# Patient Record
Sex: FEMALE | Race: WHITE | NOT HISPANIC OR LATINO | Employment: FULL TIME | URBAN - METROPOLITAN AREA
[De-identification: names, ages, dates, MRNs, and addresses within clinical notes are randomized per-mention and may not be internally consistent; named-entity substitution may affect disease eponyms.]

---

## 2017-10-26 ENCOUNTER — GENERIC CONVERSION - ENCOUNTER (OUTPATIENT)
Dept: OTHER | Facility: OTHER | Age: 42
End: 2017-10-26

## 2017-11-06 ENCOUNTER — GENERIC CONVERSION - ENCOUNTER (OUTPATIENT)
Dept: OTHER | Facility: OTHER | Age: 42
End: 2017-11-06

## 2018-11-20 ENCOUNTER — TELEPHONE (OUTPATIENT)
Dept: INTERNAL MEDICINE CLINIC | Facility: CLINIC | Age: 43
End: 2018-11-20

## 2018-11-20 ENCOUNTER — OFFICE VISIT (OUTPATIENT)
Dept: INTERNAL MEDICINE CLINIC | Facility: CLINIC | Age: 43
End: 2018-11-20
Payer: COMMERCIAL

## 2018-11-20 VITALS
BODY MASS INDEX: 35.12 KG/M2 | HEIGHT: 67 IN | WEIGHT: 223.8 LBS | TEMPERATURE: 98.2 F | SYSTOLIC BLOOD PRESSURE: 122 MMHG | RESPIRATION RATE: 14 BRPM | DIASTOLIC BLOOD PRESSURE: 80 MMHG | HEART RATE: 82 BPM

## 2018-11-20 DIAGNOSIS — J40 BRONCHITIS: Primary | ICD-10-CM

## 2018-11-20 DIAGNOSIS — J06.9 VIRAL UPPER RESPIRATORY TRACT INFECTION: ICD-10-CM

## 2018-11-20 PROCEDURE — 87880 STREP A ASSAY W/OPTIC: CPT | Performed by: PHYSICIAN ASSISTANT

## 2018-11-20 PROCEDURE — 99213 OFFICE O/P EST LOW 20 MIN: CPT | Performed by: PHYSICIAN ASSISTANT

## 2018-11-20 PROCEDURE — 1036F TOBACCO NON-USER: CPT | Performed by: PHYSICIAN ASSISTANT

## 2018-11-20 PROCEDURE — 3008F BODY MASS INDEX DOCD: CPT | Performed by: PHYSICIAN ASSISTANT

## 2018-11-20 NOTE — PROGRESS NOTES
Assessment/Plan:  Rapid strep is negative most likely viral illness treated with Allegra-D 12 Tylenol over-the-counter cough medicine return if she worsens follow-up for a physical in a couple of months       Diagnoses and all orders for this visit:    Bronchitis    Viral upper respiratory tract infection  -     POCT rapid strepA    Other orders  -     Norethin-Eth Estrad-Fe Biphas (LO LOESTRIN FE) 1 MG-10 MCG / 10 MCG TABS; Take by mouth        No problem-specific Assessment & Plan notes found for this encounter  Subjective:      Patient ID: Pretty Salas is a 40 y o  female  She developed feverishness scratchy throat stuffy nose and coughing 10 days ago she took over-the-counter medication and her symptoms began to improve  Then 5 days ago she again had feverishness increasing cough of green phlegm scratchy throat and stuffy nose  Her coughing is no longer productive  No shortness of breath or wheezing no obvious fever chills or pleuritic pain today she has hoarseness and a lot of nasal congestion with coughing at night  She is normally active and well        The following portions of the patient's history were reviewed and updated as appropriate:   She has no past medical history on file  ,   does not have a problem list on file  ,   has no past surgical history on file  ,  family history is not on file  ,   reports that she has never smoked  She has never used smokeless tobacco  She reports that she does not drink alcohol or use drugs  ,  has No Known Allergies     Current Outpatient Prescriptions   Medication Sig Dispense Refill    Norethin-Eth Estrad-Fe Biphas (LO LOESTRIN FE) 1 MG-10 MCG / 10 MCG TABS Take by mouth       No current facility-administered medications for this visit  Review of Systems   Constitutional: Negative for activity change, appetite change, chills, diaphoresis, fatigue, fever and unexpected weight change     HENT: Positive for postnasal drip, rhinorrhea, sore throat and voice change  Negative for congestion, dental problem, drooling, ear discharge, ear pain, facial swelling, hearing loss, nosebleeds, sinus pain, sinus pressure, sneezing, tinnitus and trouble swallowing  Eyes: Negative for photophobia, pain, discharge, redness, itching and visual disturbance  Respiratory: Positive for cough  Negative for apnea, choking, chest tightness, shortness of breath, wheezing and stridor  Cardiovascular: Negative for chest pain, palpitations and leg swelling  Gastrointestinal: Negative for abdominal distention, abdominal pain, anal bleeding, blood in stool, constipation, diarrhea, nausea, rectal pain and vomiting  Endocrine: Negative for cold intolerance, heat intolerance, polydipsia, polyphagia and polyuria  Genitourinary: Negative for decreased urine volume, difficulty urinating, dysuria, enuresis, flank pain, frequency, genital sores, hematuria and urgency  Musculoskeletal: Negative for arthralgias, back pain, gait problem, joint swelling, myalgias, neck pain and neck stiffness  Skin: Negative for color change, pallor, rash and wound  Neurological: Negative for dizziness, tremors, seizures, syncope, facial asymmetry, speech difficulty, weakness, light-headedness, numbness and headaches  Hematological: Negative for adenopathy  Does not bruise/bleed easily  Psychiatric/Behavioral: Negative for agitation, behavioral problems, confusion, decreased concentration, dysphoric mood, hallucinations, self-injury, sleep disturbance and suicidal ideas  The patient is not nervous/anxious and is not hyperactive  Objective:  Vitals:    11/20/18 1127   BP: 122/80   BP Location: Left arm   Patient Position: Sitting   Cuff Size: Standard   Pulse: 82   Resp: 14   Temp: 98 2 °F (36 8 °C)   TempSrc: Oral   Weight: 102 kg (223 lb 12 8 oz)   Height: 5' 7" (1 702 m)     Body mass index is 35 05 kg/m²       Physical Exam   Constitutional: She is oriented to person, place, and time  She appears well-developed  Obese   HENT:   Head: Normocephalic  Right Ear: External ear normal    Left Ear: External ear normal    Mouth/Throat: Oropharynx is clear and moist  No oropharyngeal exudate (Hoarseness noted)  Eyes: Pupils are equal, round, and reactive to light  Conjunctivae are normal    Neck: Neck supple  No JVD present  No thyromegaly present  Cardiovascular: Normal rate, regular rhythm, normal heart sounds and intact distal pulses  Pulmonary/Chest: Effort normal and breath sounds normal  No respiratory distress  She has no wheezes  She has no rales  She exhibits no tenderness  Abdominal: Soft  Bowel sounds are normal    Musculoskeletal: Normal range of motion  She exhibits no edema  Lymphadenopathy:     She has no cervical adenopathy  Neurological: She is alert and oriented to person, place, and time  She has normal reflexes  Skin: Skin is warm and dry

## 2018-11-20 NOTE — TELEPHONE ENCOUNTER
PT  BEAU  HASN'T BEEN  HERE SINCE  10/2015 WANTS  TO KNOW  IF  BEAU  CAN SEE  HER  SOON  FOR  COLD / SORE  THROAT  FEELS  LIKE  IT  IN HER  CHEST   COULD  BEAU SEE HER  THIS  WEEK  SCHEDULE  IS  FULL FOR  HALF  HOUR  APPT  BUT  PT  WANTED  ME  TO CHECK  WITH  [place]   CALL PT   BACK  [phone]

## 2019-02-25 ENCOUNTER — APPOINTMENT (OUTPATIENT)
Dept: LAB | Facility: CLINIC | Age: 44
End: 2019-02-25
Payer: COMMERCIAL

## 2019-02-25 ENCOUNTER — OFFICE VISIT (OUTPATIENT)
Dept: INTERNAL MEDICINE CLINIC | Facility: CLINIC | Age: 44
End: 2019-02-25
Payer: COMMERCIAL

## 2019-02-25 VITALS
HEIGHT: 67 IN | RESPIRATION RATE: 12 BRPM | HEART RATE: 80 BPM | TEMPERATURE: 97.7 F | DIASTOLIC BLOOD PRESSURE: 80 MMHG | WEIGHT: 224 LBS | SYSTOLIC BLOOD PRESSURE: 110 MMHG | BODY MASS INDEX: 35.16 KG/M2

## 2019-02-25 DIAGNOSIS — K30 INDIGESTION: ICD-10-CM

## 2019-02-25 DIAGNOSIS — R07.9 CHEST PAIN, UNSPECIFIED TYPE: ICD-10-CM

## 2019-02-25 DIAGNOSIS — I10 BENIGN ESSENTIAL HYPERTENSION: Primary | ICD-10-CM

## 2019-02-25 DIAGNOSIS — I10 BENIGN ESSENTIAL HYPERTENSION: ICD-10-CM

## 2019-02-25 LAB
ALBUMIN SERPL BCP-MCNC: 3.4 G/DL (ref 3.5–5)
ALP SERPL-CCNC: 84 U/L (ref 46–116)
ALT SERPL W P-5'-P-CCNC: 15 U/L (ref 12–78)
ANION GAP SERPL CALCULATED.3IONS-SCNC: 7 MMOL/L (ref 4–13)
AST SERPL W P-5'-P-CCNC: 9 U/L (ref 5–45)
BASOPHILS # BLD AUTO: 0.05 THOUSANDS/ΜL (ref 0–0.1)
BASOPHILS NFR BLD AUTO: 1 % (ref 0–1)
BILIRUB SERPL-MCNC: 0.54 MG/DL (ref 0.2–1)
BILIRUB UR QL STRIP: NEGATIVE
BUN SERPL-MCNC: 13 MG/DL (ref 5–25)
CALCIUM SERPL-MCNC: 8.4 MG/DL (ref 8.3–10.1)
CHLORIDE SERPL-SCNC: 106 MMOL/L (ref 100–108)
CHOLEST SERPL-MCNC: 166 MG/DL (ref 50–200)
CLARITY UR: CLEAR
CO2 SERPL-SCNC: 25 MMOL/L (ref 21–32)
COLOR UR: YELLOW
CREAT SERPL-MCNC: 0.98 MG/DL (ref 0.6–1.3)
DEPRECATED D DIMER PPP: 332 NG/ML (FEU)
EOSINOPHIL # BLD AUTO: 0.26 THOUSAND/ΜL (ref 0–0.61)
EOSINOPHIL NFR BLD AUTO: 4 % (ref 0–6)
ERYTHROCYTE [DISTWIDTH] IN BLOOD BY AUTOMATED COUNT: 13.2 % (ref 11.6–15.1)
EST. AVERAGE GLUCOSE BLD GHB EST-MCNC: 108 MG/DL
GFR SERPL CREATININE-BSD FRML MDRD: 71 ML/MIN/1.73SQ M
GLUCOSE P FAST SERPL-MCNC: 96 MG/DL (ref 65–99)
GLUCOSE UR STRIP-MCNC: NEGATIVE MG/DL
HBA1C MFR BLD: 5.4 % (ref 4.2–6.3)
HCT VFR BLD AUTO: 41.5 % (ref 34.8–46.1)
HDLC SERPL-MCNC: 43 MG/DL (ref 40–60)
HGB BLD-MCNC: 13.2 G/DL (ref 11.5–15.4)
HGB UR QL STRIP.AUTO: NEGATIVE
IMM GRANULOCYTES # BLD AUTO: 0.03 THOUSAND/UL (ref 0–0.2)
IMM GRANULOCYTES NFR BLD AUTO: 1 % (ref 0–2)
KETONES UR STRIP-MCNC: NEGATIVE MG/DL
LDLC SERPL CALC-MCNC: 105 MG/DL (ref 0–100)
LEUKOCYTE ESTERASE UR QL STRIP: NEGATIVE
LIPASE SERPL-CCNC: 147 U/L (ref 73–393)
LYMPHOCYTES # BLD AUTO: 1.88 THOUSANDS/ΜL (ref 0.6–4.47)
LYMPHOCYTES NFR BLD AUTO: 29 % (ref 14–44)
MCH RBC QN AUTO: 27.8 PG (ref 26.8–34.3)
MCHC RBC AUTO-ENTMCNC: 31.8 G/DL (ref 31.4–37.4)
MCV RBC AUTO: 87 FL (ref 82–98)
MONOCYTES # BLD AUTO: 0.53 THOUSAND/ΜL (ref 0.17–1.22)
MONOCYTES NFR BLD AUTO: 8 % (ref 4–12)
NEUTROPHILS # BLD AUTO: 3.66 THOUSANDS/ΜL (ref 1.85–7.62)
NEUTS SEG NFR BLD AUTO: 57 % (ref 43–75)
NITRITE UR QL STRIP: NEGATIVE
NONHDLC SERPL-MCNC: 123 MG/DL
NRBC BLD AUTO-RTO: 0 /100 WBCS
PH UR STRIP.AUTO: 6 [PH] (ref 4.5–8)
PLATELET # BLD AUTO: 331 THOUSANDS/UL (ref 149–390)
PMV BLD AUTO: 9.6 FL (ref 8.9–12.7)
POTASSIUM SERPL-SCNC: 4 MMOL/L (ref 3.5–5.3)
PROT SERPL-MCNC: 7.5 G/DL (ref 6.4–8.2)
PROT UR STRIP-MCNC: NEGATIVE MG/DL
RBC # BLD AUTO: 4.75 MILLION/UL (ref 3.81–5.12)
SODIUM SERPL-SCNC: 138 MMOL/L (ref 136–145)
SP GR UR STRIP.AUTO: 1.03 (ref 1–1.03)
TRIGL SERPL-MCNC: 89 MG/DL
TROPONIN I SERPL-MCNC: <0.02 NG/ML
TSH SERPL DL<=0.05 MIU/L-ACNC: 1.48 UIU/ML (ref 0.36–3.74)
UROBILINOGEN UR QL STRIP.AUTO: 0.2 E.U./DL
WBC # BLD AUTO: 6.41 THOUSAND/UL (ref 4.31–10.16)

## 2019-02-25 PROCEDURE — 83690 ASSAY OF LIPASE: CPT

## 2019-02-25 PROCEDURE — 93000 ELECTROCARDIOGRAM COMPLETE: CPT | Performed by: PHYSICIAN ASSISTANT

## 2019-02-25 PROCEDURE — 81003 URINALYSIS AUTO W/O SCOPE: CPT | Performed by: PHYSICIAN ASSISTANT

## 2019-02-25 PROCEDURE — 36415 COLL VENOUS BLD VENIPUNCTURE: CPT

## 2019-02-25 PROCEDURE — 85025 COMPLETE CBC W/AUTO DIFF WBC: CPT

## 2019-02-25 PROCEDURE — 83036 HEMOGLOBIN GLYCOSYLATED A1C: CPT

## 2019-02-25 PROCEDURE — 84443 ASSAY THYROID STIM HORMONE: CPT

## 2019-02-25 PROCEDURE — 80053 COMPREHEN METABOLIC PANEL: CPT

## 2019-02-25 PROCEDURE — 84484 ASSAY OF TROPONIN QUANT: CPT

## 2019-02-25 PROCEDURE — 80061 LIPID PANEL: CPT

## 2019-02-25 PROCEDURE — 99214 OFFICE O/P EST MOD 30 MIN: CPT | Performed by: PHYSICIAN ASSISTANT

## 2019-02-25 PROCEDURE — 85379 FIBRIN DEGRADATION QUANT: CPT

## 2019-02-25 RX ORDER — VALACYCLOVIR HYDROCHLORIDE 500 MG/1
TABLET, FILM COATED ORAL
Refills: 0 | COMMUNITY
Start: 2018-12-03 | End: 2021-07-29 | Stop reason: SDUPTHER

## 2019-02-25 NOTE — PROGRESS NOTES
Assessment/Plan:      No problem-specific Assessment & Plan notes found for this encounter  Diagnoses and all orders for this visit:    Benign essential hypertension  -     CBC and differential; Future  -     Comprehensive metabolic panel; Future  -     Hemoglobin A1C; Future  -     Lipid panel; Future  -     Lipase; Future  -     UA w Reflex to Microscopic w Reflex to Culture  -     TSH, 3rd generation; Future  -     POCT ECG  -     D-dimer, quantitative; Future  -     Troponin I; Future  -     Stress test only, exercise; Future    Chest pain, unspecified type  -     CBC and differential; Future  -     Comprehensive metabolic panel; Future  -     Hemoglobin A1C; Future  -     Lipid panel; Future  -     Lipase; Future  -     UA w Reflex to Microscopic w Reflex to Culture  -     TSH, 3rd generation; Future  -     POCT ECG  -     D-dimer, quantitative; Future  -     Troponin I; Future  -     Stress test only, exercise; Future    Indigestion  -     CBC and differential; Future  -     Comprehensive metabolic panel; Future  -     Hemoglobin A1C; Future  -     Lipid panel; Future  -     Lipase; Future  -     UA w Reflex to Microscopic w Reflex to Culture  -     TSH, 3rd generation; Future  -     POCT ECG  -     D-dimer, quantitative; Future  -     Troponin I; Future  -     Stress test only, exercise; Future    Other orders  -     valACYclovir (VALTREX) 500 mg tablet; TAKE 1 TABLET BY MOUTH 2 TIMES A DAY FOR 3 DAYS        Subjective:     Patient ID: Irena Lainez is a 44 y o  female  HPI    The following portions of the patient's history were reviewed and updated as appropriate:  She  has no past medical history on file  She There are no active problems to display for this patient  She  has no past surgical history on file  Her family history is not on file  She  reports that she has never smoked  She has never used smokeless tobacco  She reports that she does not drink alcohol or use drugs    Current Outpatient Medications   Medication Sig Dispense Refill    Norethin-Eth Estrad-Fe Biphas (LO LOESTRIN FE) 1 MG-10 MCG / 10 MCG TABS Take by mouth      valACYclovir (VALTREX) 500 mg tablet TAKE 1 TABLET BY MOUTH 2 TIMES A DAY FOR 3 DAYS  0     No current facility-administered medications for this visit  She has No Known Allergies       Review of Systems      Objective:     Physical Exam      Vitals:    02/25/19 0814   BP: 110/80   BP Location: Left arm   Patient Position: Sitting   Pulse: 80   Resp: 12   Temp: 97 7 °F (36 5 °C)   TempSrc: Oral   Weight: 102 kg (224 lb)   Height: 5' 7" (1 702 m)       Transitional Care Management Review:  Krystal Richmond is a 44 y o  female here for TCM follow up       During the TCM phone call patient stated:    TCM Call (since 1/25/2019)     None      TCM Call (since 1/25/2019)     None              Kristine Triana PA-C

## 2019-02-25 NOTE — PROGRESS NOTES
Assessment/Plan:  She feels a vague tight feeling across her chest that is now constant  Physical exam is unremarkable EKG is normal will get a stress test and lab work 2 week follow-up recommended a trial of OTC omeprazole she is concerned that she may have heart disease       Diagnoses and all orders for this visit:    Benign essential hypertension  -     CBC and differential; Future  -     Comprehensive metabolic panel; Future  -     Hemoglobin A1C; Future  -     Lipid panel; Future  -     Lipase; Future  -     UA w Reflex to Microscopic w Reflex to Culture  -     TSH, 3rd generation; Future  -     POCT ECG  -     D-dimer, quantitative; Future  -     Troponin I; Future  -     Stress test only, exercise; Future    Chest pain, unspecified type  -     CBC and differential; Future  -     Comprehensive metabolic panel; Future  -     Hemoglobin A1C; Future  -     Lipid panel; Future  -     Lipase; Future  -     UA w Reflex to Microscopic w Reflex to Culture  -     TSH, 3rd generation; Future  -     POCT ECG  -     D-dimer, quantitative; Future  -     Troponin I; Future  -     Stress test only, exercise; Future    Indigestion  -     CBC and differential; Future  -     Comprehensive metabolic panel; Future  -     Hemoglobin A1C; Future  -     Lipid panel; Future  -     Lipase; Future  -     UA w Reflex to Microscopic w Reflex to Culture  -     TSH, 3rd generation; Future  -     POCT ECG  -     D-dimer, quantitative; Future  -     Troponin I; Future  -     Stress test only, exercise; Future    Other orders  -     valACYclovir (VALTREX) 500 mg tablet; TAKE 1 TABLET BY MOUTH 2 TIMES A DAY FOR 3 DAYS        No problem-specific Assessment & Plan notes found for this encounter  Subjective:      Patient ID: Edmundo Zaragoza is a 44 y o  female  A week and a half ago she developed some nausea poor appetite and epigastric pain aggravated by eating    This has resolved and now she has generalized epigastric discomfort and tightness in her chest that is constant mild and vague unrelated to exertion or eating  She may feel mildly nauseated her periods have been regular she is on birth control pills  No shortness of breath palpitations dizziness sweating  No exertional discomfort she works every day with no problems    Abdominal Pain   Pertinent negatives include no arthralgias, constipation, diarrhea, dysuria, fever, frequency, headaches, hematuria, myalgias, nausea or vomiting  The following portions of the patient's history were reviewed and updated as appropriate:   She has no past medical history on file  ,  does not have a problem list on file  ,   has no past surgical history on file  ,  family history is not on file  ,   reports that she has never smoked  She has never used smokeless tobacco  She reports that she does not drink alcohol or use drugs  ,  has No Known Allergies     Current Outpatient Medications   Medication Sig Dispense Refill    Norethin-Eth Estrad-Fe Biphas (LO LOESTRIN FE) 1 MG-10 MCG / 10 MCG TABS Take by mouth      valACYclovir (VALTREX) 500 mg tablet TAKE 1 TABLET BY MOUTH 2 TIMES A DAY FOR 3 DAYS  0     No current facility-administered medications for this visit  Review of Systems   Constitutional: Negative for activity change, appetite change, chills, diaphoresis, fatigue, fever and unexpected weight change  HENT: Negative for congestion, dental problem, drooling, ear discharge, ear pain, facial swelling, hearing loss, nosebleeds, postnasal drip, rhinorrhea, sinus pressure, sinus pain, sneezing, sore throat, tinnitus, trouble swallowing and voice change  Eyes: Negative for photophobia, pain, discharge, redness, itching and visual disturbance  Respiratory: Positive for chest tightness  Negative for apnea, cough, choking, shortness of breath, wheezing and stridor  Cardiovascular: Negative for chest pain, palpitations and leg swelling  Gastrointestinal: Positive for abdominal pain  Negative for abdominal distention, anal bleeding, blood in stool, constipation, diarrhea, nausea, rectal pain and vomiting  Endocrine: Negative for cold intolerance, heat intolerance, polydipsia, polyphagia and polyuria  Genitourinary: Negative for decreased urine volume, difficulty urinating, dysuria, enuresis, flank pain, frequency, genital sores, hematuria and urgency  Musculoskeletal: Negative for arthralgias, back pain, gait problem, joint swelling, myalgias, neck pain and neck stiffness  Skin: Negative for color change, pallor, rash and wound  Neurological: Negative for dizziness, tremors, seizures, syncope, facial asymmetry, speech difficulty, weakness, light-headedness, numbness and headaches  Hematological: Negative for adenopathy  Does not bruise/bleed easily  Psychiatric/Behavioral: Negative for agitation, behavioral problems, confusion, decreased concentration, dysphoric mood, hallucinations, self-injury, sleep disturbance and suicidal ideas  The patient is not nervous/anxious and is not hyperactive  Objective:  Vitals:    02/25/19 0814   BP: 110/80   BP Location: Left arm   Patient Position: Sitting   Pulse: 80   Resp: 12   Temp: 97 7 °F (36 5 °C)   TempSrc: Oral   Weight: 102 kg (224 lb)   Height: 5' 7" (1 702 m)     Body mass index is 35 08 kg/m²  Physical Exam   Constitutional: She is oriented to person, place, and time  She appears well-developed  Obese   HENT:   Head: Normocephalic  Right Ear: External ear normal    Left Ear: External ear normal    Mouth/Throat: Oropharynx is clear and moist    Eyes: Pupils are equal, round, and reactive to light  Conjunctivae are normal    Neck: Neck supple  No thyromegaly present  Cardiovascular: Normal rate, regular rhythm, normal heart sounds and intact distal pulses  Pulmonary/Chest: Effort normal and breath sounds normal    Abdominal: Soft  Bowel sounds are normal    Musculoskeletal: Normal range of motion     Neurological: She is alert and oriented to person, place, and time  She has normal reflexes  Skin: Skin is warm and dry

## 2019-02-28 ENCOUNTER — HOSPITAL ENCOUNTER (OUTPATIENT)
Dept: NON INVASIVE DIAGNOSTICS | Facility: CLINIC | Age: 44
Discharge: HOME | End: 2019-02-28
Payer: COMMERCIAL

## 2019-02-28 DIAGNOSIS — K30 INDIGESTION: ICD-10-CM

## 2019-02-28 DIAGNOSIS — I10 BENIGN ESSENTIAL HYPERTENSION: ICD-10-CM

## 2019-02-28 DIAGNOSIS — R07.9 CHEST PAIN, UNSPECIFIED TYPE: ICD-10-CM

## 2019-02-28 LAB
ARRHY DURING EX: NORMAL
CHEST PAIN STATEMENT: NORMAL
MAX DIASTOLIC BP: 80 MMHG
MAX HEART RATE: 171 BPM
MAX PREDICTED HEART RATE: 178 BPM
MAX. SYSTOLIC BP: 156 MMHG
PROTOCOL NAME: NORMAL
REASON FOR TERMINATION: NORMAL
TARGET HR FORMULA: NORMAL
TEST INDICATION: NORMAL
TIME IN EXERCISE PHASE: NORMAL

## 2019-02-28 PROCEDURE — 93016 CV STRESS TEST SUPVJ ONLY: CPT | Performed by: INTERNAL MEDICINE

## 2019-02-28 PROCEDURE — 93017 CV STRESS TEST TRACING ONLY: CPT

## 2019-02-28 PROCEDURE — 93018 CV STRESS TEST I&R ONLY: CPT | Performed by: INTERNAL MEDICINE

## 2019-03-12 ENCOUNTER — OFFICE VISIT (OUTPATIENT)
Dept: INTERNAL MEDICINE CLINIC | Facility: CLINIC | Age: 44
End: 2019-03-12
Payer: COMMERCIAL

## 2019-03-12 VITALS
SYSTOLIC BLOOD PRESSURE: 110 MMHG | TEMPERATURE: 98.1 F | HEIGHT: 67 IN | BODY MASS INDEX: 35.31 KG/M2 | WEIGHT: 225 LBS | HEART RATE: 78 BPM | OXYGEN SATURATION: 98 % | DIASTOLIC BLOOD PRESSURE: 78 MMHG

## 2019-03-12 DIAGNOSIS — K30 INDIGESTION: ICD-10-CM

## 2019-03-12 DIAGNOSIS — I10 BENIGN ESSENTIAL HYPERTENSION: Primary | ICD-10-CM

## 2019-03-12 DIAGNOSIS — R07.9 CHEST PAIN, UNSPECIFIED TYPE: ICD-10-CM

## 2019-03-12 PROCEDURE — 99214 OFFICE O/P EST MOD 30 MIN: CPT | Performed by: PHYSICIAN ASSISTANT

## 2019-03-12 PROCEDURE — 1036F TOBACCO NON-USER: CPT | Performed by: PHYSICIAN ASSISTANT

## 2019-03-12 PROCEDURE — 3074F SYST BP LT 130 MM HG: CPT | Performed by: PHYSICIAN ASSISTANT

## 2019-03-12 PROCEDURE — 3078F DIAST BP <80 MM HG: CPT | Performed by: PHYSICIAN ASSISTANT

## 2019-03-12 PROCEDURE — 3008F BODY MASS INDEX DOCD: CPT | Performed by: PHYSICIAN ASSISTANT

## 2019-03-12 NOTE — PROGRESS NOTES
Assessment/Plan:  I reviewed all of her labs and her stress test   No abnormalities she is reassured her chest discomfort as disappeared  Taking PPI intermittently       Diagnoses and all orders for this visit:    Benign essential hypertension    Chest pain, unspecified type    Indigestion        No problem-specific Assessment & Plan notes found for this encounter  Subjective:      Patient ID: Moustapha Vera is a 44 y o  female  She is back for follow-up  She was seen here 2 weeks ago with some chest tightness and burning substernal pain she was given PPI stress test and screening labs were done  Her chest symptoms have resolved she feels well she has no complaints      The following portions of the patient's history were reviewed and updated as appropriate:   She has no past medical history on file  ,  does not have a problem list on file  ,   has no past surgical history on file  ,  family history is not on file  ,   reports that she has never smoked  She has never used smokeless tobacco  She reports that she does not drink alcohol or use drugs  ,  has No Known Allergies     Current Outpatient Medications   Medication Sig Dispense Refill    Norethin-Eth Estrad-Fe Biphas (LO LOESTRIN FE) 1 MG-10 MCG / 10 MCG TABS Take by mouth      valACYclovir (VALTREX) 500 mg tablet TAKE 1 TABLET BY MOUTH 2 TIMES A DAY FOR 3 DAYS  0     No current facility-administered medications for this visit  Review of Systems   Constitutional: Negative for activity change, appetite change, chills, diaphoresis, fatigue, fever and unexpected weight change  HENT: Negative for congestion, dental problem, drooling, ear discharge, ear pain, facial swelling, hearing loss, nosebleeds, postnasal drip, rhinorrhea, sinus pressure, sinus pain, sneezing, sore throat, tinnitus, trouble swallowing and voice change  Eyes: Negative for photophobia, pain, discharge, redness, itching and visual disturbance     Respiratory: Negative for apnea, cough, choking, chest tightness, shortness of breath, wheezing and stridor  Cardiovascular: Negative for chest pain, palpitations and leg swelling  Gastrointestinal: Negative for abdominal distention, abdominal pain, anal bleeding, blood in stool, constipation, diarrhea, nausea, rectal pain and vomiting  Endocrine: Negative for cold intolerance, heat intolerance, polydipsia, polyphagia and polyuria  Genitourinary: Negative for decreased urine volume, difficulty urinating, dysuria, enuresis, flank pain, frequency, genital sores, hematuria and urgency  Musculoskeletal: Negative for arthralgias, back pain, gait problem, joint swelling, myalgias, neck pain and neck stiffness  Skin: Negative for color change, pallor, rash and wound  Neurological: Negative for dizziness, tremors, seizures, syncope, facial asymmetry, speech difficulty, weakness, light-headedness, numbness and headaches  Hematological: Negative for adenopathy  Does not bruise/bleed easily  Psychiatric/Behavioral: Negative for agitation, behavioral problems, confusion, decreased concentration, dysphoric mood, hallucinations, self-injury, sleep disturbance and suicidal ideas  The patient is not nervous/anxious and is not hyperactive  Objective:  Vitals:    03/12/19 0931   BP: 110/78   BP Location: Left arm   Patient Position: Sitting   Pulse: 78   Temp: 98 1 °F (36 7 °C)   SpO2: 98%   Weight: 102 kg (225 lb)   Height: 5' 7" (1 702 m)     Body mass index is 35 24 kg/m²  Physical Exam   Constitutional: She is oriented to person, place, and time  She appears well-developed  Obese   HENT:   Head: Normocephalic  Right Ear: External ear normal    Left Ear: External ear normal    Mouth/Throat: Oropharynx is clear and moist    Eyes: Pupils are equal, round, and reactive to light  Conjunctivae are normal    Neck: Neck supple  No thyromegaly present     Cardiovascular: Normal rate, regular rhythm, normal heart sounds and intact distal pulses  Pulmonary/Chest: Effort normal and breath sounds normal    Abdominal: Soft  Bowel sounds are normal    Musculoskeletal: Normal range of motion  Neurological: She is alert and oriented to person, place, and time  She has normal reflexes  Skin: Skin is warm and dry

## 2019-08-09 ENCOUNTER — OFFICE VISIT (OUTPATIENT)
Dept: INTERNAL MEDICINE CLINIC | Facility: CLINIC | Age: 44
End: 2019-08-09
Payer: COMMERCIAL

## 2019-08-09 VITALS
OXYGEN SATURATION: 97 % | HEIGHT: 67 IN | HEART RATE: 91 BPM | WEIGHT: 224.6 LBS | DIASTOLIC BLOOD PRESSURE: 80 MMHG | BODY MASS INDEX: 35.25 KG/M2 | SYSTOLIC BLOOD PRESSURE: 118 MMHG

## 2019-08-09 DIAGNOSIS — R06.83 SNORING: ICD-10-CM

## 2019-08-09 DIAGNOSIS — E66.3 OVERWEIGHT: Primary | ICD-10-CM

## 2019-08-09 PROCEDURE — 99215 OFFICE O/P EST HI 40 MIN: CPT | Performed by: INTERNAL MEDICINE

## 2019-08-09 PROCEDURE — 1036F TOBACCO NON-USER: CPT | Performed by: INTERNAL MEDICINE

## 2019-08-09 PROCEDURE — 3008F BODY MASS INDEX DOCD: CPT | Performed by: INTERNAL MEDICINE

## 2019-08-09 NOTE — PROGRESS NOTES
Assessment/Plan:  Regarding her snoring I am going to refer her to Ear Nose and Throat  Otherwise labs done recently were basically stable  LDL cholesterol was slightly elevated  Will see her on a yearly basis  She will work on her cholesterol and weight over that period of time  1  Overweight         No orders of the defined types were placed in this encounter  Subjective:  She comes in for yearly checkup  Pa Nance Her only real complaint is that her family says she snores a lot  She does not think she has apneic episode  She has not been seen by an ear nose and throat doctor  Otherwise feels quite well  Is overweight  Needs to exercise more and follow strict diet  Patient ID: David García is a 44 y o  female  HPI she is generally in good health  Social history is negative for cigarettes and she rarely drinks  Past medical history positive for childbirth and cholecystectomy  The following portions of the patient's history were reviewed and updated as appropriate:   She has no past medical history on file  ,  does not have a problem list on file  ,   has no past surgical history on file  ,  family history is not on file  ,   reports that she has never smoked  She has never used smokeless tobacco  She reports that she does not drink alcohol or use drugs  ,  has No Known Allergies       Current Outpatient Medications:     Norethin-Eth Estrad-Fe Biphas (LO LOESTRIN FE) 1 MG-10 MCG / 10 MCG TABS, Take by mouth, Disp: , Rfl:     valACYclovir (VALTREX) 500 mg tablet, TAKE 1 TABLET BY MOUTH 2 TIMES A DAY FOR 3 DAYS, Disp: , Rfl: 0    Review of Systems   Constitutional: Negative for activity change, appetite change, chills, diaphoresis, fatigue, fever and unexpected weight change  HENT: Negative for congestion, ear pain, hearing loss, mouth sores, nosebleeds, postnasal drip, sinus pressure, sinus pain, sore throat and trouble swallowing           She has excessive snoring   Eyes: Negative for pain, discharge and visual disturbance  Respiratory: Negative for apnea, cough, chest tightness, shortness of breath and wheezing  Cardiovascular: Negative for chest pain, palpitations and leg swelling  Gastrointestinal: Negative for abdominal pain, anal bleeding, blood in stool, constipation, diarrhea, nausea and vomiting  She is overweight   Endocrine: Negative for polydipsia and polyphagia  Genitourinary: Negative for decreased urine volume, dysuria, flank pain, frequency, hematuria and urgency  Musculoskeletal: Negative for arthralgias, back pain, gait problem, joint swelling and myalgias  Skin: Negative for rash and wound  Allergic/Immunologic: Negative for environmental allergies and food allergies  Neurological: Negative for dizziness, tremors, seizures, syncope, speech difficulty, light-headedness, numbness and headaches  Hematological: Negative for adenopathy  Does not bruise/bleed easily  Psychiatric/Behavioral: Negative for agitation, confusion, hallucinations, sleep disturbance and suicidal ideas  The patient is not nervous/anxious  Objective:  /80 (BP Location: Left arm, Patient Position: Sitting, Cuff Size: Standard)   Pulse 91   Ht 5' 7" (1 702 m)   Wt 102 kg (224 lb 9 6 oz)   SpO2 97%   BMI 35 18 kg/m²      Physical Exam   Constitutional: She appears well-developed and well-nourished  No distress  HENT:   Head: Normocephalic  Right Ear: External ear normal    Left Ear: External ear normal    Nose: Nose normal    Mouth/Throat: Oropharynx is clear and moist  No oropharyngeal exudate  Eyes: Pupils are equal, round, and reactive to light  Conjunctivae and EOM are normal  Right eye exhibits no discharge  Left eye exhibits no discharge  Neck: Normal range of motion  Neck supple  No thyromegaly present  Cardiovascular: Normal rate, regular rhythm, normal heart sounds and intact distal pulses  Exam reveals no gallop and no friction rub  No murmur heard  Pulmonary/Chest: Effort normal and breath sounds normal  No respiratory distress  She has no wheezes  She has no rales  Abdominal: Soft  Bowel sounds are normal  She exhibits no distension and no mass  There is no tenderness  There is no rebound and no guarding  Abdomen is moderately overweight  Musculoskeletal: Normal range of motion  She exhibits no edema, tenderness or deformity  Lymphadenopathy:     She has no cervical adenopathy  Neurological: She is alert  She has normal reflexes  She displays normal reflexes  No cranial nerve deficit  Coordination normal    Skin: Skin is warm and dry  No rash noted  No erythema  Psychiatric: She has a normal mood and affect  Her behavior is normal  Judgment and thought content normal    Nursing note and vitals reviewed  No results found for this or any previous visit (from the past 1008 hour(s))

## 2020-01-07 ENCOUNTER — OFFICE VISIT (OUTPATIENT)
Dept: INTERNAL MEDICINE CLINIC | Facility: CLINIC | Age: 45
End: 2020-01-07
Payer: COMMERCIAL

## 2020-01-07 ENCOUNTER — APPOINTMENT (OUTPATIENT)
Dept: LAB | Facility: CLINIC | Age: 45
End: 2020-01-07
Payer: COMMERCIAL

## 2020-01-07 VITALS
HEART RATE: 99 BPM | OXYGEN SATURATION: 97 % | HEIGHT: 67 IN | WEIGHT: 226.8 LBS | DIASTOLIC BLOOD PRESSURE: 78 MMHG | SYSTOLIC BLOOD PRESSURE: 110 MMHG | BODY MASS INDEX: 35.6 KG/M2 | TEMPERATURE: 97.9 F

## 2020-01-07 DIAGNOSIS — J02.0 STREP PHARYNGITIS: Primary | ICD-10-CM

## 2020-01-07 DIAGNOSIS — J02.0 STREP PHARYNGITIS: ICD-10-CM

## 2020-01-07 LAB
BASOPHILS # BLD AUTO: 0.07 THOUSANDS/ΜL (ref 0–0.1)
BASOPHILS NFR BLD AUTO: 0 % (ref 0–1)
EOSINOPHIL # BLD AUTO: 0.19 THOUSAND/ΜL (ref 0–0.61)
EOSINOPHIL NFR BLD AUTO: 1 % (ref 0–6)
ERYTHROCYTE [DISTWIDTH] IN BLOOD BY AUTOMATED COUNT: 13.8 % (ref 11.6–15.1)
HCT VFR BLD AUTO: 39.9 % (ref 34.8–46.1)
HGB BLD-MCNC: 12.7 G/DL (ref 11.5–15.4)
IMM GRANULOCYTES # BLD AUTO: 0.11 THOUSAND/UL (ref 0–0.2)
IMM GRANULOCYTES NFR BLD AUTO: 1 % (ref 0–2)
LYMPHOCYTES # BLD AUTO: 1.52 THOUSANDS/ΜL (ref 0.6–4.47)
LYMPHOCYTES NFR BLD AUTO: 9 % (ref 14–44)
MCH RBC QN AUTO: 27.4 PG (ref 26.8–34.3)
MCHC RBC AUTO-ENTMCNC: 31.8 G/DL (ref 31.4–37.4)
MCV RBC AUTO: 86 FL (ref 82–98)
MONOCYTES # BLD AUTO: 1.43 THOUSAND/ΜL (ref 0.17–1.22)
MONOCYTES NFR BLD AUTO: 9 % (ref 4–12)
NEUTROPHILS # BLD AUTO: 13.59 THOUSANDS/ΜL (ref 1.85–7.62)
NEUTS SEG NFR BLD AUTO: 80 % (ref 43–75)
NRBC BLD AUTO-RTO: 0 /100 WBCS
PLATELET # BLD AUTO: 294 THOUSANDS/UL (ref 149–390)
PMV BLD AUTO: 9.7 FL (ref 8.9–12.7)
RBC # BLD AUTO: 4.63 MILLION/UL (ref 3.81–5.12)
S PYO AG THROAT QL: POSITIVE
WBC # BLD AUTO: 16.91 THOUSAND/UL (ref 4.31–10.16)

## 2020-01-07 PROCEDURE — 86663 EPSTEIN-BARR ANTIBODY: CPT

## 2020-01-07 PROCEDURE — 86665 EPSTEIN-BARR CAPSID VCA: CPT

## 2020-01-07 PROCEDURE — 85025 COMPLETE CBC W/AUTO DIFF WBC: CPT

## 2020-01-07 PROCEDURE — 86664 EPSTEIN-BARR NUCLEAR ANTIGEN: CPT

## 2020-01-07 PROCEDURE — 87880 STREP A ASSAY W/OPTIC: CPT | Performed by: INTERNAL MEDICINE

## 2020-01-07 PROCEDURE — 99213 OFFICE O/P EST LOW 20 MIN: CPT | Performed by: INTERNAL MEDICINE

## 2020-01-07 PROCEDURE — 36415 COLL VENOUS BLD VENIPUNCTURE: CPT

## 2020-01-07 RX ORDER — AMOXICILLIN 500 MG/1
500 CAPSULE ORAL EVERY 8 HOURS SCHEDULED
Qty: 30 CAPSULE | Refills: 0 | Status: SHIPPED | OUTPATIENT
Start: 2020-01-07 | End: 2020-01-17

## 2020-01-07 NOTE — PROGRESS NOTES
Assessment/Plan:  She has acute follicular tonsillitis  I put her back on amoxicillin check CBC and mono test   Gargle  Call if not improved  1  Strep pharyngitis  POCT rapid strepA    amoxicillin (AMOXIL) 500 mg capsule    CBC and differential    EBV, Chronic/Active Infection       Orders Placed This Encounter   Procedures    CBC and differential    EBV, Chronic/Active Infection    POCT rapid strepA         Subjective:  Sore throat     Patient ID: Adrienne Hein is a 38 y o  female  HPI she was sick in December got better on amoxicillin then got sick again 48 hours ago  Also has a dry cough  The following portions of the patient's history were reviewed and updated as appropriate:   She has no past medical history on file  ,  does not have a problem list on file  ,   has no past surgical history on file  ,  family history is not on file  ,   reports that she has never smoked  She has never used smokeless tobacco  She reports that she does not drink alcohol or use drugs  ,  has No Known Allergies       Current Outpatient Medications:     Norethin-Eth Estrad-Fe Biphas (LO LOESTRIN FE) 1 MG-10 MCG / 10 MCG TABS, Take by mouth, Disp: , Rfl:     valACYclovir (VALTREX) 500 mg tablet, TAKE 1 TABLET BY MOUTH 2 TIMES A DAY FOR 3 DAYS, Disp: , Rfl: 0    amoxicillin (AMOXIL) 500 mg capsule, Take 1 capsule (500 mg total) by mouth every 8 (eight) hours for 10 days, Disp: 30 capsule, Rfl: 0    Review of Systems  positive severe sore throat  Slight ear pain  Slight rhinitis  Slight cough  Positive fever chills  No nausea vomiting  No abdominal pain    Objective:  /78 (BP Location: Left arm, Patient Position: Sitting, Cuff Size: Standard)   Pulse 99   Temp 97 9 °F (36 6 °C)   Ht 5' 7" (1 702 m)   Wt 103 kg (226 lb 12 8 oz)   SpO2 97%   BMI 35 52 kg/m²      Physical Exam  in temperature is 97  9  Tympanic membranes dull  Cerumen right external auditory canal   Positive submandibular adenopathy  Posterior pharynx shows tonsillar hypertrophy with exudate  Chest is clear  Rhythm is regular  No murmur    Abdomen is overweight soft nondistended no masses      Recent Results (from the past 1008 hour(s))   POCT rapid strepA    Collection Time: 01/07/20 12:14 PM   Result Value Ref Range     RAPID STREP A Positive (A) Negative

## 2020-01-08 LAB
EBV EA IGG SER-ACNC: <9 U/ML (ref 0–8.9)
EBV NA IGG SER IA-ACNC: 164 U/ML (ref 0–17.9)
EBV PATRN SPEC IB-IMP: ABNORMAL
EBV VCA IGG SER IA-ACNC: 491 U/ML (ref 0–17.9)
EBV VCA IGM SER IA-ACNC: <36 U/ML (ref 0–35.9)

## 2020-01-10 ENCOUNTER — TELEPHONE (OUTPATIENT)
Dept: INTERNAL MEDICINE CLINIC | Facility: CLINIC | Age: 45
End: 2020-01-10

## 2020-01-10 DIAGNOSIS — J20.9 ACUTE BRONCHITIS, UNSPECIFIED ORGANISM: Primary | ICD-10-CM

## 2020-01-10 RX ORDER — PROMETHAZINE HYDROCHLORIDE AND CODEINE PHOSPHATE 6.25; 1 MG/5ML; MG/5ML
5 SYRUP ORAL EVERY 4 HOURS PRN
Qty: 120 ML | Refills: 0 | Status: SHIPPED | OUTPATIENT
Start: 2020-01-10 | End: 2020-01-23 | Stop reason: SDUPTHER

## 2020-01-10 NOTE — TELEPHONE ENCOUNTER
Okay
Patient called she was seen on 01/07 with Dr Adam Prakash for cough, nasal congestion, sore throat, cold like symptoms  She is still feeling sick and is coughing a lot at night  She did take mucinex but that did not seem to work  Patient was wondering could Dr Adam Prakash send " promethazine with codeine" for nighttime to her pharmacy      [facility] [address]    Please call patient when this is sent- [phone]
Hyperthyroidism

## 2020-01-23 ENCOUNTER — OFFICE VISIT (OUTPATIENT)
Dept: INTERNAL MEDICINE CLINIC | Facility: CLINIC | Age: 45
End: 2020-01-23
Payer: COMMERCIAL

## 2020-01-23 VITALS
RESPIRATION RATE: 14 BRPM | BODY MASS INDEX: 35.69 KG/M2 | TEMPERATURE: 97.8 F | HEIGHT: 67 IN | HEART RATE: 70 BPM | WEIGHT: 227.4 LBS | DIASTOLIC BLOOD PRESSURE: 76 MMHG | SYSTOLIC BLOOD PRESSURE: 118 MMHG

## 2020-01-23 DIAGNOSIS — J20.9 ACUTE BRONCHITIS, UNSPECIFIED ORGANISM: Primary | ICD-10-CM

## 2020-01-23 DIAGNOSIS — R05.9 COUGH: ICD-10-CM

## 2020-01-23 PROCEDURE — 3008F BODY MASS INDEX DOCD: CPT | Performed by: NURSE PRACTITIONER

## 2020-01-23 PROCEDURE — 1036F TOBACCO NON-USER: CPT | Performed by: NURSE PRACTITIONER

## 2020-01-23 PROCEDURE — 99213 OFFICE O/P EST LOW 20 MIN: CPT | Performed by: NURSE PRACTITIONER

## 2020-01-23 RX ORDER — BENZONATATE 100 MG/1
100 CAPSULE ORAL 3 TIMES DAILY PRN
Qty: 20 CAPSULE | Refills: 0 | Status: SHIPPED | OUTPATIENT
Start: 2020-01-23 | End: 2020-07-23

## 2020-01-23 RX ORDER — ALBUTEROL SULFATE 90 UG/1
2 AEROSOL, METERED RESPIRATORY (INHALATION) EVERY 6 HOURS PRN
Qty: 1 INHALER | Refills: 5 | Status: SHIPPED | OUTPATIENT
Start: 2020-01-23 | End: 2022-08-03 | Stop reason: ALTCHOICE

## 2020-01-23 RX ORDER — PROMETHAZINE HYDROCHLORIDE AND CODEINE PHOSPHATE 6.25; 1 MG/5ML; MG/5ML
5 SYRUP ORAL EVERY 4 HOURS PRN
Qty: 120 ML | Refills: 0 | Status: SHIPPED | OUTPATIENT
Start: 2020-01-23 | End: 2020-07-23

## 2020-01-23 RX ORDER — PREDNISONE 10 MG/1
TABLET ORAL
Qty: 30 TABLET | Refills: 0 | Status: SHIPPED | OUTPATIENT
Start: 2020-01-23 | End: 2020-07-23

## 2020-01-23 NOTE — PATIENT INSTRUCTIONS
Flonase nasal spray  Nasal decongestant over the counter  Cool mist humidifier    Acute Bronchitis   WHAT YOU NEED TO KNOW:   Acute bronchitis is swelling and irritation in the air passages of your lungs  This irritation may cause you to cough or have other breathing problems  Acute bronchitis often starts because of another illness, such as a cold or the flu  The illness spreads from your nose and throat to your windpipe and airways  Bronchitis is often called a chest cold  Acute bronchitis lasts about 3 to 6 weeks and is usually not a serious illness  Your cough can last for several weeks  DISCHARGE INSTRUCTIONS:   Return to the emergency department if:   · You cough up blood  · Your lips or fingernails turn blue  · You feel like you are not getting enough air when you breathe  Contact your healthcare provider if:   · You have a fever  · Your breathing problems do not go away or get worse  · Your cough does not get better within 4 weeks  · You have questions or concerns about your condition or care  Self-care:   · Get more rest   Rest helps your body to heal  Slowly start to do more each day  Rest when you feel it is needed  · Avoid irritants in the air  Avoid chemicals, fumes, and dust  Wear a face mask if you must work around dust or fumes  Stay inside on days when air pollution levels are high  If you have allergies, stay inside when pollen counts are high  Do not use aerosol products, such as spray-on deodorant, bug spray, and hair spray  · Do not smoke or be around others who smoke  Nicotine and other chemicals in cigarettes and cigars damages the cilia that move mucus out of your lungs  Ask your healthcare provider for information if you currently smoke and need help to quit  E-cigarettes or smokeless tobacco still contain nicotine  Talk to your healthcare provider before you use these products  · Drink liquids as directed    Liquids help keep your air passages moist and help you cough up mucus  You may need to drink more liquids when you have acute bronchitis  Ask how much liquid to drink each day and which liquids are best for you  · Use a humidifier or vaporizer  Use a cool mist humidifier or a vaporizer to increase air moisture in your home  This may make it easier for you to breathe and help decrease your cough  Decrease risk for acute bronchitis:   · Get the vaccinations you need  Ask your healthcare provider if you should get vaccinated against the flu or pneumonia  · Prevent the spread of germs  You can decrease your risk of acute bronchitis and other illnesses by doing the following:     [facility] your hands often with soap and water  Carry germ-killing hand lotion or gel with you  You can use the lotion or gel to clean your hands when soap and water are not available  ¨ Do not touch your eyes, nose, or mouth unless you have washed your hands first     ¨ Always cover your mouth when you cough to prevent the spread of germs  It is best to cough into a tissue or your shirt sleeve instead of into your hand  Ask those around you cover their mouths when they cough  ¨ Try to avoid people who have a cold or the flu  If you are sick, stay away from others as much as possible  Medicines: Your healthcare provider may  give you any of the following:  · Ibuprofen or acetaminophen  are medicines that help lower your fever  They are available without a doctor's order  Ask your healthcare provider which medicine is right for you  Ask how much to take and how often to take it  Follow directions  These medicines can cause stomach bleeding if not taken correctly  Ibuprofen can cause kidney damage  Do not take ibuprofen if you have kidney disease, an ulcer, or allergies to aspirin  Acetaminophen can cause liver damage  Do not take more than 4,000 milligrams in 24 hours  · Decongestants  help loosen mucus in your lungs and make it easier to cough up   This can help you breathe easier  · Cough suppressants  decrease your urge to cough  If your cough produces mucus, do not take a cough suppressant unless your healthcare provider tells you to  Your healthcare provider may suggest that you take a cough suppressant at night so you can rest     · Inhalers  may be given  Your healthcare provider may give you one or more inhalers to help you breathe easier and cough less  An inhaler gives your medicine to open your airways  Ask your healthcare provider to show you how to use your inhaler correctly  · Take your medicine as directed  Contact your healthcare provider if you think your medicine is not helping or if you have side effects  Tell him of her if you are allergic to any medicine  Keep a list of the medicines, vitamins, and herbs you take  Include the amounts, and when and why you take them  Bring the list or the pill bottles to follow-up visits  Carry your medicine list with you in case of an emergency  Follow up with your healthcare provider as directed:  Write down questions you have so you will remember to ask them during your follow-up visits  © 2017 [employer] Information is for End User's use only and may not be sold, redistributed or otherwise used for commercial purposes  All illustrations and images included in CareNotes® are the copyrighted property of [employer]  or Lee Mandujano  The above information is an  only  It is not intended as medical advice for individual conditions or treatments  Talk to your doctor, nurse or pharmacist before following any medical regimen to see if it is safe and effective for you

## 2020-01-23 NOTE — PROGRESS NOTES
INTERNAL MEDICINE FOLLOW-UP OFFICE VISIT  [facility] [address] [place] [address]    NAME: Sharif Batres  AGE: 38 y o  SEX: female    DATE OF ENCOUNTER: 1/23/2020   Assessment and Plan:     Problem List Items Addressed This Visit     None      Visit Diagnoses     Acute bronchitis, unspecified organism    -  Primary      Tapering dose of prednisone, Proventil inhaler, Tessalon Perles, nasal decongestant, Flonase  Relevant Medications    benzonatate (TESSALON PERLES) 100 mg capsule    albuterol (PROVENTIL HFA,VENTOLIN HFA) 90 mcg/act inhaler    promethazine-codeine (PHENERGAN WITH CODEINE) 6 25-10 mg/5 mL syrup    Cough        Relevant Medications    benzonatate (TESSALON PERLES) 100 mg capsule    predniSONE 10 mg tablet    albuterol (PROVENTIL HFA,VENTOLIN HFA) 90 mcg/act inhaler          No follow-ups on file  Counseling:     · Medication Side Effects - Adverse side effects of medications were reviewed with the patient/guardian today: Yes  · Counseling was given regarding: Intructions for management  · Barriers to treatment include: No identified barriers      Chief Complaint:     Chief Complaint   Patient presents with    Cough     has had it for a month  History of Present Illness:     SANCHEZ Genao to the office today for same-day sick visit  She is a 61-year-old female who was treated for strep throat back in December and again in early January  The patient states she has had a residual cough since December  She describes this as a dry cough  She denies any fever, shortness of breath, wheezing or chest pain  The patient states occasionally her ears are "popping"  She had been given Phenergan with codeine for her cough which she takes at bedtime  The patient does have a history of childhood asthma as well as bronchitis as an adult        The patient does have posterior oropharyngeal erythema on exam but this is most likely due to postnasal drip and irritation  The patient did have several coughing bouts while in the office today  I will treat the patient for bronch hiatus  Tapering dose of steroids were sent to her pharmacy along with Gabriel Owen for the cough  The patient was provided a Proventil inhaler to use every 4-6 hours as needed for bronchospasm  The patient also requested a refill on her Phenergan with codeine for her to take at night  I do not believe the patient would benefit from antibiotics at this time as she recently completed 2 doses of antibiotics for her strep throat  I also recommended to the patient that she take a over-the-counter nasal decongestant as well as using Flonase nasal spray  Cool-mist humidifier was also recommended to the patient she was instructed to contact our office should her symptoms not improve by Monday  She was also instructed that if she becomes  Short of breath, has chest tightness, or a high fever she should report to the emergency room for evaluation  The following portions of the patient's history were reviewed and updated as appropriate: allergies, current medications, past family history, past medical history, past social history, past surgical history and problem list      Review of Systems:     Review of Systems   Constitutional: Negative  HENT: Positive for postnasal drip and sinus pressure  Negative for congestion, ear pain, rhinorrhea, sinus pain, sore throat and trouble swallowing  Eyes: Negative  Respiratory: Positive for cough (dry cough)  Negative for shortness of breath  Cardiovascular: Negative  Gastrointestinal: Negative  Genitourinary: Negative  Musculoskeletal: Negative  Skin: Negative  Neurological: Positive for headaches (occasional)  Psychiatric/Behavioral: Negative  Problem List:   There is no problem list on file for this patient         Objective:     /76 (BP Location: Left arm, Patient Position: Sitting, Cuff Size: Standard) Pulse 70   Temp 97 8 °F (36 6 °C) (Oral)   Resp 14   Ht 5' 7" (1 702 m)   Wt 103 kg (227 lb 6 4 oz)   BMI 35 62 kg/m²     Physical Exam   Constitutional: She is oriented to person, place, and time  She appears well-developed and well-nourished  No distress  HENT:   Head: Normocephalic and atraumatic  Right Ear: Hearing, tympanic membrane, external ear and ear canal normal  No decreased hearing is noted  Left Ear: Hearing, tympanic membrane, external ear and ear canal normal  No decreased hearing is noted  Nose: Nose normal    Mouth/Throat: Uvula is midline and mucous membranes are normal  No uvula swelling  Posterior oropharyngeal erythema present  No oropharyngeal exudate or tonsillar abscesses  Tonsils are 1+ on the right  Tonsils are 1+ on the left  No tonsillar exudate  Eyes: Pupils are equal, round, and reactive to light  EOM are normal    Neck: Normal range of motion  Neck supple  Cardiovascular: Normal rate, regular rhythm and normal heart sounds  Pulmonary/Chest: Effort normal and breath sounds normal  No respiratory distress  She has no wheezes  Musculoskeletal: Normal range of motion  Lymphadenopathy:     She has no cervical adenopathy  Neurological: She is alert and oriented to person, place, and time  Skin: Skin is warm and dry  Psychiatric: She has a normal mood and affect  Her behavior is normal  Judgment and thought content normal        Pertinent Laboratory/Diagnostic Studies:    Laboratory Results: I have personally reviewed the pertinent laboratory results/reports   Radiology/Other Diagnostic Testing Results: I have personally reviewed pertinent reports         Current Medications:     Current Outpatient Medications   Medication Sig Dispense Refill    Norethin-Eth Estrad-Fe Biphas (LO LOESTRIN FE) 1 MG-10 MCG / 10 MCG TABS Take by mouth      promethazine-codeine (PHENERGAN WITH CODEINE) 6 25-10 mg/5 mL syrup Take 5 mL by mouth every 4 (four) hours as needed for cough 120 mL 0    valACYclovir (VALTREX) 500 mg tablet TAKE 1 TABLET BY MOUTH 2 TIMES A DAY FOR 3 DAYS  0     No current facility-administered medications for this visit  There are no Patient Instructions on file for this visit      KAROLINA Trent  [facility] [address] [place] [address]

## 2020-07-23 ENCOUNTER — OFFICE VISIT (OUTPATIENT)
Dept: OBGYN CLINIC | Age: 45
End: 2020-07-23
Payer: COMMERCIAL

## 2020-07-23 VITALS
WEIGHT: 227 LBS | HEIGHT: 67 IN | DIASTOLIC BLOOD PRESSURE: 78 MMHG | BODY MASS INDEX: 35.63 KG/M2 | SYSTOLIC BLOOD PRESSURE: 120 MMHG

## 2020-07-23 DIAGNOSIS — N92.0 MENORRHAGIA WITH REGULAR CYCLE: ICD-10-CM

## 2020-07-23 DIAGNOSIS — Z01.419 ENCOUNTER FOR GYNECOLOGICAL EXAMINATION WITHOUT ABNORMAL FINDING: Primary | ICD-10-CM

## 2020-07-23 DIAGNOSIS — Z30.41 SURVEILLANCE OF CONTRACEPTIVE PILL: ICD-10-CM

## 2020-07-23 DIAGNOSIS — Z12.31 ENCOUNTER FOR SCREENING MAMMOGRAM FOR MALIGNANT NEOPLASM OF BREAST: ICD-10-CM

## 2020-07-23 PROCEDURE — 87624 HPV HI-RISK TYP POOLED RSLT: CPT | Performed by: NURSE PRACTITIONER

## 2020-07-23 PROCEDURE — 3074F SYST BP LT 130 MM HG: CPT | Performed by: NURSE PRACTITIONER

## 2020-07-23 PROCEDURE — 3078F DIAST BP <80 MM HG: CPT | Performed by: NURSE PRACTITIONER

## 2020-07-23 PROCEDURE — 99386 PREV VISIT NEW AGE 40-64: CPT | Performed by: NURSE PRACTITIONER

## 2020-07-23 PROCEDURE — 3008F BODY MASS INDEX DOCD: CPT | Performed by: NURSE PRACTITIONER

## 2020-07-23 PROCEDURE — G0145 SCR C/V CYTO,THINLAYER,RESCR: HCPCS | Performed by: NURSE PRACTITIONER

## 2020-07-23 NOTE — PROGRESS NOTES
Diagnoses and all orders for this visit:    Encounter for gynecological examination without abnormal finding  -     Liquid-based pap, screening    Surveillance of contraceptive pill  -     Norethin-Eth Estrad-Fe Biphas (Lo Loestrin Fe) 1 MG-10 MCG / 10 MCG TABS; Take 1 tablet by mouth daily    Menorrhagia with regular cycle  -     Norethin-Eth Estrad-Fe Biphas (Lo Loestrin Fe) 1 MG-10 MCG / 10 MCG TABS; Take 1 tablet by mouth daily  -     US pelvis complete w transvaginal; Future    Encounter for screening mammogram for malignant neoplasm of breast  -     Mammo screening bilateral w 3d & cad; Future          Calcium/vit d inclusion in the diet discussed, call with any issues, SBE reinforced, all concerns addressed  Pleasant 38 y o  premenopausal female here for annual exam  She denies any issues with bleeding or her menses when she is on ocp but she ran out 3 months ago and now her menses can last up to 12 days  Reports regular cycles though  Denies history of abnormal pap smears  Last Pap , PREFERS pap today  Denies vaginal issues  Denies pelvic pain  Denies any issues with her BCM  NONSMOKER Sexually active without any concerns  She states she has RLQ hardness in the lower pelvis area that comes and goes (not present today)  Concerned about groin sweatiness      Past Medical History:   Diagnosis Date    Asthma      Past Surgical History:   Procedure Laterality Date     SECTION      CHOLECYSTECTOMY       Family History   Problem Relation Age of Onset    Breast cancer Neg Hx     Colon cancer Neg Hx     Ovarian cancer Neg Hx     Uterine cancer Neg Hx     Cervical cancer Neg Hx      Social History     Tobacco Use    Smoking status: Never Smoker    Smokeless tobacco: Never Used   Substance Use Topics    Alcohol use: Yes     Frequency: Monthly or less    Drug use: No       Current Outpatient Medications:     albuterol (PROVENTIL HFA,VENTOLIN HFA) 90 mcg/act inhaler, Inhale 2 puffs every 6 (six) hours as needed for wheezing or shortness of breath, Disp: 1 Inhaler, Rfl: 5    Norethin-Eth Estrad-Fe Biphas (Lo Loestrin Fe) 1 MG-10 MCG / 10 MCG TABS, Take 1 tablet by mouth daily, Disp: 28 tablet, Rfl: 12    valACYclovir (VALTREX) 500 mg tablet, TAKE 1 TABLET BY MOUTH 2 TIMES A DAY FOR 3 DAYS, Disp: , Rfl: 0  There are no active problems to display for this patient  No Known Allergies    OB History    Para Term  AB Living   2 2 2     3   SAB TAB Ectopic Multiple Live Births         1 3      # Outcome Date GA Lbr Ryan/2nd Weight Sex Delivery Anes PTL Lv   2A Term            2B Term            1 Term               at [facility]  Twin 6 yr old boys and 6 yr old daughter    Vitals:    20 0836   BP: 120/78   BP Location: Right arm   Patient Position: Sitting   Weight: 103 kg (227 lb)   Height: 5' 7" (1 702 m)     Body mass index is 35 55 kg/m²  Review of Systems   Constitutional: Negative for chills, fatigue, fever and unexpected weight change  Respiratory: Negative for shortness of breath  Gastrointestinal: Negative for anal bleeding, blood in stool, constipation and diarrhea  Genitourinary: Negative for difficulty urinating, dysuria and hematuria  Physical Exam   Constitutional: She appears well-developed and well-nourished  No distress  HENT:   Head: Normocephalic  Neck: Normal range of motion  Neck supple  Pulmonary: Effort normal   Breasts: bilateral without masses, skin changes or nipple discharge  Bilaterally soft and warm to touch  No areas of erythema or pain  Abdominal: Soft  Pelvic exam was performed with patient supine  No labial fusion  There is no rash, tenderness, lesion or injury on the right labia  There is no rash, tenderness, lesion or injury on the left labia  Urethral meatus does not show any tenderness, inflammation or discharge  Palpation of midline bladder without pain or discomfort  Uterus is not deviated, not enlarged, not fixed and not tender  Cervix exhibits no motion tenderness, no discharge and no friability  Right adnexum displays no mass, no tenderness and no fullness  Left adnexum displays no mass, no tenderness and no fullness  No erythema or tenderness in the vagina  No foreign body in the vagina  No signs of injury around the vagina  No vaginal discharge found  No signs of injury around the vagina or anus  Perineum without lesions, signs of injury, erythema or swelling  Lymphadenopathy:        Right: No inguinal adenopathy present  Left: No inguinal adenopathy present     LIMITED EXAM D/T OBESITY

## 2020-07-25 LAB
HPV HR 12 DNA CVX QL NAA+PROBE: NEGATIVE
HPV16 DNA CVX QL NAA+PROBE: NEGATIVE
HPV18 DNA CVX QL NAA+PROBE: NEGATIVE

## 2020-07-29 LAB
LAB AP GYN PRIMARY INTERPRETATION: NORMAL
Lab: NORMAL

## 2020-09-29 DIAGNOSIS — N92.0 MENORRHAGIA WITH REGULAR CYCLE: ICD-10-CM

## 2020-09-29 DIAGNOSIS — Z30.41 SURVEILLANCE OF CONTRACEPTIVE PILL: ICD-10-CM

## 2020-09-29 RX ORDER — NORETHINDRONE ACETATE AND ETHINYL ESTRADIOL, ETHINYL ESTRADIOL AND FERROUS FUMARATE 1MG-10(24)
1 KIT ORAL DAILY
Qty: 90 TABLET | Refills: 2 | Status: SHIPPED | OUTPATIENT
Start: 2020-09-29 | End: 2021-05-21

## 2020-11-24 ENCOUNTER — TELEPHONE (OUTPATIENT)
Dept: OBGYN CLINIC | Facility: CLINIC | Age: 45
End: 2020-11-24

## 2021-04-12 DIAGNOSIS — Z23 ENCOUNTER FOR IMMUNIZATION: ICD-10-CM

## 2021-04-23 ENCOUNTER — IMMUNIZATIONS (OUTPATIENT)
Dept: FAMILY MEDICINE CLINIC | Facility: HOSPITAL | Age: 46
End: 2021-04-23

## 2021-04-23 DIAGNOSIS — Z23 ENCOUNTER FOR IMMUNIZATION: Primary | ICD-10-CM

## 2021-04-23 PROCEDURE — 0001A SARS-COV-2 / COVID-19 MRNA VACCINE (PFIZER-BIONTECH) 30 MCG: CPT

## 2021-04-23 PROCEDURE — 91300 SARS-COV-2 / COVID-19 MRNA VACCINE (PFIZER-BIONTECH) 30 MCG: CPT

## 2021-05-18 ENCOUNTER — IMMUNIZATIONS (OUTPATIENT)
Dept: FAMILY MEDICINE CLINIC | Facility: HOSPITAL | Age: 46
End: 2021-05-18

## 2021-05-18 DIAGNOSIS — Z23 ENCOUNTER FOR IMMUNIZATION: Primary | ICD-10-CM

## 2021-05-18 PROCEDURE — 91300 SARS-COV-2 / COVID-19 MRNA VACCINE (PFIZER-BIONTECH) 30 MCG: CPT

## 2021-05-18 PROCEDURE — 0002A SARS-COV-2 / COVID-19 MRNA VACCINE (PFIZER-BIONTECH) 30 MCG: CPT

## 2021-05-21 DIAGNOSIS — Z30.41 SURVEILLANCE OF CONTRACEPTIVE PILL: ICD-10-CM

## 2021-05-21 DIAGNOSIS — N92.0 MENORRHAGIA WITH REGULAR CYCLE: ICD-10-CM

## 2021-05-21 RX ORDER — NORETHINDRONE ACETATE AND ETHINYL ESTRADIOL, ETHINYL ESTRADIOL AND FERROUS FUMARATE 1MG-10(24)
KIT ORAL
Qty: 84 TABLET | Refills: 0 | Status: SHIPPED | OUTPATIENT
Start: 2021-05-21 | End: 2021-07-15 | Stop reason: SDUPTHER

## 2021-05-26 ENCOUNTER — HOSPITAL ENCOUNTER (EMERGENCY)
Facility: HOSPITAL | Age: 46
Discharge: HOME | End: 2021-05-27
Attending: EMERGENCY MEDICINE | Admitting: EMERGENCY MEDICINE
Payer: COMMERCIAL

## 2021-05-26 VITALS
SYSTOLIC BLOOD PRESSURE: 160 MMHG | OXYGEN SATURATION: 95 % | DIASTOLIC BLOOD PRESSURE: 85 MMHG | RESPIRATION RATE: 18 BRPM | HEART RATE: 127 BPM | TEMPERATURE: 99.3 F

## 2021-05-26 DIAGNOSIS — B34.9 VIRAL SYNDROME: Primary | ICD-10-CM

## 2021-05-26 PROCEDURE — 99282 EMERGENCY DEPT VISIT SF MDM: CPT | Performed by: EMERGENCY MEDICINE

## 2021-05-26 PROCEDURE — 99283 EMERGENCY DEPT VISIT LOW MDM: CPT

## 2021-05-27 LAB — SARS-COV-2 RNA RESP QL NAA+PROBE: NEGATIVE

## 2021-05-27 PROCEDURE — U0003 INFECTIOUS AGENT DETECTION BY NUCLEIC ACID (DNA OR RNA); SEVERE ACUTE RESPIRATORY SYNDROME CORONAVIRUS 2 (SARS-COV-2) (CORONAVIRUS DISEASE [COVID-19]), AMPLIFIED PROBE TECHNIQUE, MAKING USE OF HIGH THROUGHPUT TECHNOLOGIES AS DESCRIBED BY CMS-2020-01-R: HCPCS | Performed by: EMERGENCY MEDICINE

## 2021-05-27 PROCEDURE — U0005 INFEC AGEN DETEC AMPLI PROBE: HCPCS | Performed by: EMERGENCY MEDICINE

## 2021-05-27 NOTE — ED PROVIDER NOTES
History  Chief Complaint   Patient presents with    Fever - 9 weeks to 74 years     Pt states she started with a cough and fever yesterday and congestion today    Cough     HPI  43-year-old female presents with cough, fever and congestion for the past day  Family members with similar symptoms  No chest pain or shortness of breath  Nothing makes better or worse  Requesting COVID-19 test   Prior to Admission Medications   Prescriptions Last Dose Informant Patient Reported? Taking? Lo Loestrin Fe 1 MG-10 MCG / 10 MCG TABS   No No   Sig: TAKE 1 TABLET DAILY   albuterol (PROVENTIL HFA,VENTOLIN HFA) 90 mcg/act inhaler   No No   Sig: Inhale 2 puffs every 6 (six) hours as needed for wheezing or shortness of breath   valACYclovir (VALTREX) 500 mg tablet  Self Yes No   Sig: TAKE 1 TABLET BY MOUTH 2 TIMES A DAY FOR 3 DAYS      Facility-Administered Medications: None       Past Medical History:   Diagnosis Date    Asthma        Past Surgical History:   Procedure Laterality Date     SECTION      CHOLECYSTECTOMY         Family History   Problem Relation Age of Onset    Breast cancer Neg Hx     Colon cancer Neg Hx     Ovarian cancer Neg Hx     Uterine cancer Neg Hx     Cervical cancer Neg Hx      I have reviewed and agree with the history as documented  E-Cigarette/Vaping    E-Cigarette Use Never User      E-Cigarette/Vaping Substances     Social History     Tobacco Use    Smoking status: Never Smoker    Smokeless tobacco: Never Used   Substance Use Topics    Alcohol use: Yes     Frequency: Monthly or less    Drug use: No       Review of Systems   Constitutional: Positive for fever  Negative for chills  HENT: Positive for congestion  Negative for dental problem and ear pain  Eyes: Negative for pain and redness  Respiratory: Positive for cough  Negative for shortness of breath  Cardiovascular: Negative for chest pain and palpitations     Gastrointestinal: Negative for abdominal pain and nausea  Endocrine: Negative for polydipsia and polyphagia  Genitourinary: Negative for dysuria and frequency  Musculoskeletal: Negative for arthralgias and joint swelling  Skin: Negative for color change and rash  Neurological: Negative for dizziness and headaches  Psychiatric/Behavioral: Negative for behavioral problems and confusion  All other systems reviewed and are negative  Physical Exam  Physical Exam  Vitals signs and nursing note reviewed  Constitutional:       General: She is not in acute distress  Appearance: She is well-developed  She is not diaphoretic  HENT:      Head: Atraumatic  Right Ear: External ear normal       Left Ear: External ear normal       Nose: Nose normal    Eyes:      Conjunctiva/sclera: Conjunctivae normal       Pupils: Pupils are equal, round, and reactive to light  Neck:      Musculoskeletal: Normal range of motion and neck supple  Vascular: No JVD  Cardiovascular:      Rate and Rhythm: Normal rate and regular rhythm  Heart sounds: Normal heart sounds  No murmur  Pulmonary:      Effort: Pulmonary effort is normal  No respiratory distress  Breath sounds: Normal breath sounds  No wheezing  Abdominal:      General: Bowel sounds are normal  There is no distension  Palpations: Abdomen is soft  Tenderness: There is no abdominal tenderness  Musculoskeletal: Normal range of motion  Skin:     General: Skin is warm and dry  Capillary Refill: Capillary refill takes less than 2 seconds  Neurological:      Mental Status: She is alert and oriented to person, place, and time  Cranial Nerves: No cranial nerve deficit     Psychiatric:         Behavior: Behavior normal          Vital Signs  ED Triage Vitals [05/26/21 2346]   Temperature Pulse Respirations Blood Pressure SpO2   99 3 °F (37 4 °C) (!) 127 18 160/85 95 %      Temp Source Heart Rate Source Patient Position - Orthostatic VS BP Location FiO2 (%)   Oral Monitor -- Right arm --      Pain Score       --           Vitals:    05/26/21 2346   BP: 160/85   Pulse: (!) 127         Visual Acuity      ED Medications  Medications - No data to display    Diagnostic Studies  Results Reviewed     Procedure Component Value Units Date/Time    Novel Coronavirus [facility] [[id number]]  (Normal) Collected: 05/27/21 0007    Lab Status: Final result Specimen: Nares from Nasopharyngeal Swab Updated: 05/27/21 0207     SARS-CoV-2 Negative    Narrative: The specimen collection materials, transport medium, and/or testing methodology utilized in the production of these test results have been proven to be reliable in a limited validation with an abbreviated program under the Emergency Utilization Authorization provided by the FDA  Testing reported as "Presumptive positive" will be confirmed with secondary testing to ensure result accuracy  Clinical caution and judgement should be used with the interpretation of these results with consideration of the clinical impression and other laboratory testing  Testing reported as "Positive" or "Negative" has been proven to be accurate according to standard laboratory validation requirements  All testing is performed with control materials showing appropriate reactivity at standard intervals  No orders to display              Procedures  Procedures         ED Course                             SBIRT 20yo+      Most Recent Value   SBIRT (24 yo +)   In order to provide better care to our patients, we are screening all of our patients for alcohol and drug use  Would it be okay to ask you these screening questions? No Filed at: 05/27/2021 0002                    MDM  Patient presents with symptoms consistent with viral etiology  Have obtained COVID-19 swab  She appears well, nontoxic  Discussed symptomatic care, quarantine until results come back     Disposition  Final diagnoses:   Viral syndrome     Time reflects when diagnosis was documented in both MDM as applicable and the Disposition within this note     Time User Action Codes Description Comment    5/27/2021 12:03 AM Sourav Regan Add [B34 9] Viral syndrome       ED Disposition     ED Disposition Condition Date/Time Comment    Discharge Stable Thu May 27, 2021 12:03 AM Moustapha Vera discharge to home/self care  Follow-up Information     Follow up With Specialties Details Why Contact Aisha    Jewel Khanna, DO Internal Medicine  As needed [address] [address]  [phone]            Discharge Medication List as of 5/27/2021 12:03 AM      CONTINUE these medications which have NOT CHANGED    Details   albuterol (PROVENTIL HFA,VENTOLIN HFA) 90 mcg/act inhaler Inhale 2 puffs every 6 (six) hours as needed for wheezing or shortness of breath, Starting Thu 1/23/2020, Normal      Lo Loestrin Fe 1 MG-10 MCG / 10 MCG TABS TAKE 1 TABLET DAILY, Normal      valACYclovir (VALTREX) 500 mg tablet TAKE 1 TABLET BY MOUTH 2 TIMES A DAY FOR 3 DAYS, Historical Med           No discharge procedures on file      PDMP Review       Value Time User    PDMP Reviewed  Yes 1/23/2020  9:56 AM KAROLINA Harding          ED Provider  Electronically Signed by           Calderon Jaimes MD  05/27/21 4415

## 2021-07-15 ENCOUNTER — TELEPHONE (OUTPATIENT)
Dept: OBGYN CLINIC | Facility: CLINIC | Age: 46
End: 2021-07-15

## 2021-07-15 DIAGNOSIS — Z30.41 SURVEILLANCE OF CONTRACEPTIVE PILL: ICD-10-CM

## 2021-07-15 DIAGNOSIS — N92.0 MENORRHAGIA WITH REGULAR CYCLE: ICD-10-CM

## 2021-07-15 RX ORDER — NORETHINDRONE ACETATE AND ETHINYL ESTRADIOL, ETHINYL ESTRADIOL AND FERROUS FUMARATE 1MG-10(24)
1 KIT ORAL DAILY
Qty: 28 TABLET | Refills: 0 | Status: SHIPPED | OUTPATIENT
Start: 2021-07-15 | End: 2021-07-29 | Stop reason: SDUPTHER

## 2021-07-15 NOTE — TELEPHONE ENCOUNTER
Pt called in regards to refill of birth control   Pt has annual scheduled for 7/29 w/ sis please advise rx [facility] [place]

## 2021-07-29 ENCOUNTER — ANNUAL EXAM (OUTPATIENT)
Dept: OBGYN CLINIC | Facility: CLINIC | Age: 46
End: 2021-07-29
Payer: COMMERCIAL

## 2021-07-29 VITALS
DIASTOLIC BLOOD PRESSURE: 88 MMHG | SYSTOLIC BLOOD PRESSURE: 111 MMHG | WEIGHT: 238 LBS | HEIGHT: 67 IN | BODY MASS INDEX: 37.35 KG/M2

## 2021-07-29 DIAGNOSIS — Z30.41 SURVEILLANCE OF CONTRACEPTIVE PILL: ICD-10-CM

## 2021-07-29 DIAGNOSIS — Z12.31 SCREENING MAMMOGRAM, ENCOUNTER FOR: ICD-10-CM

## 2021-07-29 DIAGNOSIS — Z01.419 ENCOUNTER FOR GYNECOLOGICAL EXAMINATION WITHOUT ABNORMAL FINDING: Primary | ICD-10-CM

## 2021-07-29 DIAGNOSIS — B00.9 HSV INFECTION: ICD-10-CM

## 2021-07-29 PROCEDURE — 99396 PREV VISIT EST AGE 40-64: CPT | Performed by: NURSE PRACTITIONER

## 2021-07-29 RX ORDER — VALACYCLOVIR HYDROCHLORIDE 500 MG/1
500 TABLET, FILM COATED ORAL 2 TIMES DAILY
Qty: 90 TABLET | Refills: 1 | Status: SHIPPED | OUTPATIENT
Start: 2021-07-29 | End: 2022-08-03

## 2021-07-29 RX ORDER — NORETHINDRONE ACETATE AND ETHINYL ESTRADIOL, ETHINYL ESTRADIOL AND FERROUS FUMARATE 1MG-10(24)
1 KIT ORAL DAILY
Qty: 84 TABLET | Refills: 4 | Status: SHIPPED | OUTPATIENT
Start: 2021-07-29 | End: 2021-08-04 | Stop reason: ALTCHOICE

## 2021-07-29 NOTE — PATIENT INSTRUCTIONS
Birth Control Pills   WHAT YOU NEED TO KNOW:   What are birth control pills? Birth control pills are also called oral contraceptives, or the pill  It is medicine that helps prevent pregnancy by stopping ovulation  Ovulation is when the ovaries make and release an egg cell each month  If this egg gets fertilized by sperm, pregnancy occurs  You will need to take the pill at the same time every day  Your healthcare provider will tell you when to start taking the pill  You will also be told what to do if you miss a dose  Instructions will depend on the kind of birth control pills you are taking  What are the different kinds of birth control pills? Some kinds are taken for 21 days in a row, followed by 7 days of placebo (no hormones) pills  Other kinds are taken for 24 days followed by 4 days of placebos  Each kind has a certain amount of female hormones  Your provider will decide on the kind that is best for you based on your age and other health conditions  What may be done before I can start taking birth control pills? You need to see your healthcare provider to get a prescription  Any of the following may be done before your healthcare provider gives you a prescription:  · Your healthcare provider will ask about diseases and illnesses you have had in the past  Your provider will check your risk for blood clots, heart conditions, or stroke  Tell your provider if you had gastric bypass surgery  This surgery can affect the way your body absorbs medicines such as birth control pills  · Your provider will also check your blood pressure, and may do a breast and pelvic exam  A Pap smear may also be done during the pelvic exam  This is a test to make sure you do not have abnormal changes on your cervix  You may need other tests, such as a urine test to make sure you are not pregnant  · Your provider will ask if you take any medicines and if you smoke   Smoking increases your risk for stroke, heart attack, or a blood clot in your lungs  If you smoke, you should not take certain kinds of birth control pills  What are the advantages of birth control pills? When birth control pills are used correctly, the chances of getting pregnant are very low  Birth control pills may help decrease bleeding and pain during your monthly period  They may also help prevent cancer of the uterus and ovaries  What are the disadvantages of birth control pills? You may have sudden changes in your mood or feelings while you take birth control pills  You may have nausea and a decreased sex drive  You may have an increased appetite and rapid weight gain  You may also have bleeding in between periods, less frequent periods, vaginal dryness, and breast pain  Birth control pills will not protect you from sexually transmitted infections  Rarely, some birth control pills can increase your risk for a blood clot  This may become life-threatening  What should I do if I decide I want to get pregnant? If you are planning to have a baby, ask your healthcare provider when you may stop taking your birth control pills  It may take some time for you to start ovulating again  Ask your healthcare provider for more information about pregnancy after birth control pills  When should I start taking birth control pills after I have a baby? If you are not breastfeeding, you may start taking birth control pills 3 weeks after you give birth  You may be able to take certain types of birth control pills if you are breastfeeding  These pills can be started from 6 weeks to 6 months after you give birth  Ask your healthcare provider for more information about when to start taking birth control pills after you give birth  What do I need to know about birth control pills and menopause? · Talk with your healthcare provider if you want to take birth control pills around menopause  · Around age 39, you will enter into perimenopause   This means your hormone levels are dropping and you are ovulating less often  You can still become pregnant during this time  The risk for problems, such as miscarriage, are higher if you become pregnant after age 39  Birth control pills will prevent pregnancy, and may also help prevent or relieve some signs and symptoms of menopause  Examples are hot flashes and mood swings  · Your provider will do tests when you are around age 48  The tests may show that you are in menopause  If the tests do not show menopause for sure, you may be able to continue taking the pill up to age 54  The decision will depend on your health and if you have any medical conditions, such as a blood clot  Call your local emergency number (911 in the [address]) for any of the following:   · You have any of the following signs of a stroke:      ? Numbness or drooping on one side of your face     ? Weakness in an arm or leg    ? Confusion or difficulty speaking    ? Dizziness, a severe headache, or vision loss    · You feel lightheaded, short of breath, and have chest pain  · You cough up blood  When should I seek immediate care? · Your arm or leg feels warm, tender, and painful  It may look swollen and red  · You have severe pain, numbness, or swelling in your arms or legs  When should I call my doctor? · You have forgotten to take a birth control pill  · You have mood changes, such as depression, since starting birth control pills  · You have nausea or are vomiting  · You have severe abdominal pain  · You missed a period and have questions or concerns about being pregnant  · You still have bleeding 4 months after taking birth control pills correctly  · You have questions or concerns about your condition or care  CARE AGREEMENT:   You have the right to help plan your care  Learn about your health condition and how it may be treated  Discuss treatment options with your healthcare providers to decide what care you want to receive   You always have the right to refuse treatment  The above information is an  only  It is not intended as medical advice for individual conditions or treatments  Talk to your doctor, nurse or pharmacist before following any medical regimen to see if it is safe and effective for you  © Copyright [employer] 2021 Information is for End User's use only and may not be sold, redistributed or otherwise used for commercial purposes   All illustrations and images included in CareNotes® are the copyrighted property of A D A M , Inc  or [address] [place] [address]

## 2021-07-29 NOTE — PROGRESS NOTES
Diagnoses and all orders for this visit:    Encounter for gynecological examination without abnormal finding    Surveillance of contraceptive pill  -     Norethin-Eth Estrad-Fe Biphas (Lo Loestrin Fe) 1 MG-10 MCG / 10 MCG TABS; Take 1 tablet by mouth daily    HSV infection  -     valACYclovir (VALTREX) 500 mg tablet; Take 1 tablet (500 mg total) by mouth 2 (two) times a day for 3 days prn    Screening mammogram, encounter for  -     Mammo screening bilateral w 3d & cad; Future        Calcium/vit d inclusion in the diet discussed, call with any issues, SBE reinforced, all concerns addressed  Pleasant 41 y o  premenopausal female here for annual exam  She denies any issues with bleeding or her menses when she is on ocp (Heavy cycles when not on ocp)  She reports regular, VERY light to NO cycles  Denies history of abnormal pap smears  Last Pap  neg and HPV neg, NO pap today  Denies vaginal issues  Denies pelvic pain  Denies any issues with her BCM  NONSMOKER Sexually active without any concerns  Past Medical History:   Diagnosis Date    Asthma      Past Surgical History:   Procedure Laterality Date     SECTION      CHOLECYSTECTOMY       Family History   Problem Relation Age of Onset    Breast cancer Neg Hx     Colon cancer Neg Hx     Ovarian cancer Neg Hx     Uterine cancer Neg Hx     Cervical cancer Neg Hx      Social History     Tobacco Use    Smoking status: Never Smoker    Smokeless tobacco: Never Used   Vaping Use    Vaping Use: Never used   Substance Use Topics    Alcohol use:  Yes    Drug use: No       Current Outpatient Medications:     albuterol (PROVENTIL HFA,VENTOLIN HFA) 90 mcg/act inhaler, Inhale 2 puffs every 6 (six) hours as needed for wheezing or shortness of breath, Disp: 1 Inhaler, Rfl: 5    Norethin-Eth Estrad-Fe Biphas (Lo Loestrin Fe) 1 MG-10 MCG / 10 MCG TABS, Take 1 tablet by mouth daily, Disp: 84 tablet, Rfl: 4    valACYclovir (VALTREX) 500 mg tablet, Take 1 tablet (500 mg total) by mouth 2 (two) times a day for 3 days prn, Disp: 90 tablet, Rfl: 1  Patient Active Problem List    Diagnosis Date Noted    Surveillance of contraceptive pill 2021    HSV infection 2021       No Known Allergies    OB History    Para Term  AB Living   2 2 2     3   SAB TAB Ectopic Multiple Live Births         1 3      # Outcome Date GA Lbr Ryan/2nd Weight Sex Delivery Anes PTL Lv   2A Term            2B Term            1 Term               at [facility]  Twin 5 yr old boys and 15 yr old daughter    Vitals:    21 0916   BP: 111/88   BP Location: Left arm   Patient Position: Sitting   Cuff Size: Standard   Weight: 108 kg (238 lb)   Height: 5' 7" (1 702 m)     Body mass index is 37 28 kg/m²  Review of Systems   Constitutional: Negative for chills, fatigue, fever and unexpected weight change  Respiratory: Negative for shortness of breath  Gastrointestinal: Negative for anal bleeding, blood in stool, constipation and diarrhea  Genitourinary: Negative for difficulty urinating, dysuria and hematuria  Physical Exam   Constitutional: She appears well-developed and well-nourished  No distress  HENT:   Head: Normocephalic  Neck: Normal range of motion  Neck supple  Pulmonary: Effort normal   Breasts: bilateral without masses, skin changes or nipple discharge  Bilaterally soft and warm to touch  No areas of erythema or pain  Abdominal: Soft  Pelvic exam was performed with patient supine  No labial fusion  There is no rash, tenderness, lesion or injury on the right labia  There is no rash, tenderness, lesion or injury on the left labia  Urethral meatus does not show any tenderness, inflammation or discharge  Palpation of midline bladder without pain or discomfort  Uterus is not deviated, not enlarged, not fixed and not tender  Cervix exhibits no motion tenderness, no discharge and no friability   Right adnexum displays no mass, no tenderness and no fullness  Left adnexum displays no mass, no tenderness and no fullness  No erythema or tenderness in the vagina  No foreign body in the vagina  No signs of injury around the vagina  No vaginal discharge found  No signs of injury around the vagina or anus  Perineum without lesions, signs of injury, erythema or swelling  Lymphadenopathy:        Right: No inguinal adenopathy present          Left: No inguinal adenopathy present

## 2021-08-04 DIAGNOSIS — Z30.41 SURVEILLANCE OF CONTRACEPTIVE PILL: Primary | ICD-10-CM

## 2021-08-04 RX ORDER — NORETHINDRONE ACETATE AND ETHINYL ESTRADIOL 1MG-20(24)
KIT ORAL
Qty: 84 TABLET | Refills: 4 | Status: SHIPPED | OUTPATIENT
Start: 2021-08-04 | End: 2022-08-03 | Stop reason: ALTCHOICE

## 2021-08-06 ENCOUNTER — TELEPHONE (OUTPATIENT)
Dept: OBGYN CLINIC | Facility: CLINIC | Age: 46
End: 2021-08-06

## 2022-05-26 DIAGNOSIS — Z12.31 SCREENING MAMMOGRAM, ENCOUNTER FOR: ICD-10-CM

## 2022-05-27 DIAGNOSIS — Z12.31 ENCOUNTER FOR SCREENING MAMMOGRAM FOR MALIGNANT NEOPLASM OF BREAST: ICD-10-CM

## 2022-08-03 ENCOUNTER — ANNUAL EXAM (OUTPATIENT)
Dept: OBGYN CLINIC | Facility: CLINIC | Age: 47
End: 2022-08-03
Payer: COMMERCIAL

## 2022-08-03 VITALS
SYSTOLIC BLOOD PRESSURE: 122 MMHG | BODY MASS INDEX: 35.94 KG/M2 | WEIGHT: 229 LBS | DIASTOLIC BLOOD PRESSURE: 86 MMHG | HEIGHT: 67 IN

## 2022-08-03 DIAGNOSIS — Z12.31 SCREENING MAMMOGRAM, ENCOUNTER FOR: ICD-10-CM

## 2022-08-03 DIAGNOSIS — N92.0 MENORRHAGIA WITH REGULAR CYCLE: ICD-10-CM

## 2022-08-03 DIAGNOSIS — Z01.419 ENCOUNTER FOR GYNECOLOGICAL EXAMINATION WITHOUT ABNORMAL FINDING: Primary | ICD-10-CM

## 2022-08-03 DIAGNOSIS — Z12.11 SCREEN FOR COLON CANCER: ICD-10-CM

## 2022-08-03 PROBLEM — Z30.41 SURVEILLANCE OF CONTRACEPTIVE PILL: Status: RESOLVED | Noted: 2021-07-29 | Resolved: 2022-08-03

## 2022-08-03 PROCEDURE — 99396 PREV VISIT EST AGE 40-64: CPT | Performed by: NURSE PRACTITIONER

## 2022-08-03 PROCEDURE — 0503F POSTPARTUM CARE VISIT: CPT | Performed by: NURSE PRACTITIONER

## 2022-08-03 NOTE — PROGRESS NOTES
Patient presents for: yearly  Menarche- 14  Last Pap Smear- 07/23/2020  Hx of abnormal paps- NEVER  Birth control- NONE  ALMOST YEAR AGO SHE STOPPED    Mammogram- 05/25/2022  DXA- not on file   Colonoscopy- not on file     Smoking status- never  Last sexual activity- yes  Family history of uterine, ovarian, cervical or breast cancer-  NONE   Concerns-    NO QUESTIONS OR CONCERNS

## 2022-08-03 NOTE — PATIENT INSTRUCTIONS
Breast Self Exam for Women   AMBULATORY CARE:   A breast self-exam (BSE)  is a way to check your breasts for lumps and other changes  Regular BSEs can help you know how your breasts normally look and feel  Most breast lumps or changes are not cancer, but you should always have them checked by a healthcare provider  Why you should do a BSE:  Breast cancer is the most common type of cancer in women  Even if you have mammograms, you may still want to do a BSE regularly  If you know how your breasts normally feel and look, it may help you know when to contact your healthcare provider  Mammograms can miss some cancers  You may find a lump during a BSE that did not show up on a mammogram   When you should do a BSE:  If you have periods, you may want to do your BSE 1 week after your period ends  This is the time when your breasts may be the least swollen, lumpy, or tender  You can do regular BSEs even if you are breastfeeding or have breast implants  Call your doctor if:   You find any lumps or changes in your breasts  You have breast pain or fluid coming from your nipples  You have questions or concerns about your condition or care  How to do a BSE:       Look at your breasts in a mirror  Look at the size and shape of each breast and nipple  Check for swelling, lumps, dimpling, scaly skin, or other skin changes  Look for nipple changes, such as a nipple that is painful or beginning to pull inward  Gently squeeze both nipples and check to see if fluid (that is not breast milk) comes out of them  If you find any of these or other breast changes, contact your healthcare provider  Check your breasts while you sit or  the following 3 positions:    [place] your arms down at your sides  Raise your hands and join them behind your head  Put firm pressure with your hands on your hips  Bend slightly forward while you look at your breasts in the mirror  Lie down and feel your breasts    When you lie down, your breast tissue spreads out evenly over your chest  This makes it easier for you to feel for lumps and anything that may not be normal for your breasts  Do a BSE on one breast at a time  Place a small pillow or towel under your left shoulder  Put your left arm behind your head  Use the 3 middle fingers of your right hand  Use your fingertip pads, on the top of your fingers  Your fingertip pad is the most sensitive part of your finger  Use small circles to feel your breast tissue  Use your fingertip pads to make dime-sized, overlapping circles on your breast and armpits  Use light, medium, and firm pressure  First, press lightly  Second, press with medium pressure to feel a little deeper into the breast  Last, use firm pressure to feel deep within your breast     Examine your entire breast area  Examine the breast area from above the breast to below the breast where you feel only ribs  Make small circles with your fingertips, starting in the middle of your armpit  Make circles going up and down the breast area  Continue toward your breast and all the way across it  Examine the area from your armpit all the way over to the middle of your chest (breastbone)  Stop at the middle of your chest     Move the pillow or towel to your right shoulder, and put your right arm behind your head  Use the 3 fingertip pads of your left hand, and repeat the above steps to do a BSE on your right breast     What else you can do to check for breast problems or cancer:  Talk to your healthcare provider about mammograms  A mammogram is an x-ray of your breasts to screen for breast cancer or other problems  Your provider can tell you the benefits and risks of mammograms  The first mammogram is usually at age 39 or 48  Your provider may recommend you start at 36 or younger if your risk for breast cancer is high  Mammograms usually continue every 1 to 2 years until age 76         Follow up with your doctor as directed:  Write down your questions so you remember to ask them during your visits  © Copyright [employer] 2022 Information is for End User's use only and may not be sold, redistributed or otherwise used for commercial purposes  All illustrations and images included in CareNotes® are the copyrighted property of A D A M , Inc  or Sumit Agudelo  The above information is an  only  It is not intended as medical advice for individual conditions or treatments  Talk to your doctor, nurse or pharmacist before following any medical regimen to see if it is safe and effective for you  Menorrhagia   AMBULATORY CARE:   Menorrhagia  is heavy menstrual bleeding for more than 7 days or severe menstrual bleeding for less than 7 days  Your menstrual bleeding and cramping are so heavy that you have trouble doing your usual daily activities  Your monthly period may also occur more often, and you may bleed between periods  Menorrhagia is common in adolescence and around menopause  Common symptoms include the following:   Soaking a pad or tampon every 1 to 2 hours    Using both a pad and a tampon    Waking up at night to change your pad or tampon    Blood clots with your bleeding for more than 1 day    Abdominal pain or cramps    Call your local emergency number (911 in the [address] [place] [address],[address]) for any of the following: You have chest pain and shortness of breath  Your heart is fluttering or beating faster than usual for you  Seek care immediately if:   You feel dizzy when you stand  You feel confused  You have severe abdominal pain, nausea, and vomiting  Your skin or the whites of your eyes turn yellow  Call your doctor or gynecologist if:   You need to change your pad or tampon more than 1 time per hour, for several hours in a row  You feel more weak and tired than usual     You have new coldness in your hands and feet  You have questions or concerns about your condition or care  Medicines:   You may need any of the following:  Iron supplements  may be given if your blood iron level decreases because of heavy bleeding  NSAIDs , such as ibuprofen, help decrease swelling, pain, and fever  NSAIDs can cause stomach bleeding or kidney problems in certain people  If you take blood thinner medicine, always ask your healthcare provider if NSAIDs are safe for you  Always read the medicine label and follow directions  Hormones  help slow or stop your bleeding and make your monthly periods more regular  This medicine may be given as birth control pills or an intrauterine device (IUD)  Take your medicine as directed  Contact your healthcare provider if you think your medicine is not helping or if you have side effects  Tell him of her if you are allergic to any medicine  Keep a list of the medicines, vitamins, and herbs you take  Include the amounts, and when and why you take them  Bring the list or the pill bottles to follow-up visits  Carry your medicine list with you in case of an emergency  Manage your symptoms:   Keep a supply of pads or tampons with you at all times  If possible, stay close to a bathroom  Apply heat  on your abdomen to decrease pain and cramps  You can use a heating pad on a low setting  Apply heat for 20 to 30 minutes every 2 hours for as many days as directed  Follow up with your doctor or gynecologist as directed: You may need regular pelvic exams with Pap smears to monitor your condition  Write down your questions so you remember to ask them during your visits  © Copyright [employer] 2022 Information is for End User's use only and may not be sold, redistributed or otherwise used for commercial purposes  All illustrations and images included in CareNotes® are the copyrighted property of A D A M , Inc  or [facility] [place]   The above information is an  only  It is not intended as medical advice for individual conditions or treatments   Talk to your doctor, nurse or pharmacist before following any medical regimen to see if it is safe and effective for you

## 2023-10-18 NOTE — PROGRESS NOTES
Assessment/Plan:    No problem-specific Assessment & Plan notes found for this encounter. Diagnoses and all orders for this visit:    Encounter for annual routine gynecological examination    Encounter for colorectal cancer screening  -     Ambulatory Referral to Gastroenterology; Future    Encounter for screening mammogram for malignant neoplasm of breast  -     Mammo screening bilateral w 3d & cad; Future        Health Maintenance:    Last PAP: 2020. Neg/neg  Next PAP Due: 2025    Last Mammogram: 2023. BI-RADS 1  Next Mammogram: 2024    Last Colonoscopy: Not on file. Subjective    CC: Yearly Exam     Joselyn Jose is a 56 y.o. female here for an annual exam.   Chief Complaint   Patient presents with    Gynecologic Exam       Menarche: 16 Y/O  -  x 1, CS x1  Patient's last menstrual period was 2023. Sexual activity: She is not sexually active  Contraception: none  STD testing:  She does not want STD testing today. Gyn Hx - denies h/o abnormal pap smears. Non- smoker, social drinker    Here for annual visit. No questions or concerns today. Her menstrual cycles are regular every 28-30 days. She denies any issues with bleeding or her menses. She denies breast concerns, abnormal vaginal discharge, vaginal itching, odor, irritation, bowel/bladder dysfunction, urinary symptoms, pelvic pain, or dyspareunia today.      Family hx of breast cancer: No  Family hx of ovarian cancer: No  Family hx of colon cancer: No    Past Medical History:   Diagnosis Date    Asthma      Past Surgical History:   Procedure Laterality Date     SECTION      CHOLECYSTECTOMY         Immunization History   Administered Date(s) Administered    COVID-19 PFIZER VACCINE 0.3 ML IM 2021, 2021    COVID-19 Pfizer vac (Howard-sucrose, gray cap) 12 yr+ IM 2022    INFLUENZA 10/15/2018, 10/15/2019       Family History   Problem Relation Age of Onset    Breast cancer Neg Hx     Colon cancer Neg Hx     Ovarian cancer Neg Hx     Uterine cancer Neg Hx     Cervical cancer Neg Hx      Social History     Tobacco Use    Smoking status: Never    Smokeless tobacco: Never   Vaping Use    Vaping Use: Never used   Substance Use Topics    Alcohol use: Yes    Drug use: No       Current Outpatient Medications:     valACYclovir (VALTREX) 500 mg tablet, Take 1 tablet (500 mg total) by mouth 2 (two) times a day for 3 days prn, Disp: 90 tablet, Rfl: 1  Patient Active Problem List    Diagnosis Date Noted    Menorrhagia with regular cycle 2022    HSV infection 2021       No Known Allergies    OB History    Para Term  AB Living   2 2 2     3   SAB IAB Ectopic Multiple Live Births         1 3      # Outcome Date GA Lbr Ryan/2nd Weight Sex Delivery Anes PTL Lv   2A Term      CS-Unspec   ANA   2B Term      CS-Unspec   ANA   1 Term      Vag-Spont   ANA      Obstetric Comments   Menarche 16 y.o       Vitals:    10/20/23 0909   BP: 112/80   BP Location: Left arm   Patient Position: Sitting   Weight: 104 kg (228 lb 3.2 oz)   Height: 5' 7" (1.702 m)     Body mass index is 35.74 kg/m². Review of Systems   Constitutional:  Negative for chills, fatigue, fever and unexpected weight change. Gastrointestinal:  Negative for abdominal pain, constipation, diarrhea, nausea and vomiting. Genitourinary:  Negative for difficulty urinating, dysuria, frequency, menstrual problem, pelvic pain, urgency, vaginal bleeding, vaginal discharge and vaginal pain. Musculoskeletal:  Negative for back pain and myalgias. Skin:  Negative for pallor and rash. Neurological:  Negative for headaches. Hematological:  Negative for adenopathy. Psychiatric/Behavioral:  Negative for dysphoric mood. Physical Exam  Constitutional:       General: She is not in acute distress. Appearance: Normal appearance. She is not ill-appearing. Genitourinary:      Urethral meatus normal.      No lesions in the vagina. Right Labia: No rash, tenderness, lesions or skin changes. Left Labia: No tenderness, lesions, skin changes or rash. No inguinal adenopathy present in the right or left side. No vaginal discharge, erythema, tenderness, bleeding or ulceration. Right Adnexa: not tender, not full and no mass present. Left Adnexa: not tender, not full and no mass present. Cervix is parous. No cervical motion tenderness, discharge, friability, lesion, polyp or nabothian cyst.      Uterus is not enlarged, fixed or tender. No uterine mass detected. No urethral prolapse, tenderness or discharge present. Bladder is not tender and urgency on palpation not present. Pelvic exam was performed with patient in the lithotomy position. Breasts:     Right: No swelling, inverted nipple, mass, nipple discharge, skin change or tenderness. Left: No swelling, inverted nipple, mass, nipple discharge, skin change or tenderness. HENT:      Head: Normocephalic and atraumatic. Eyes:      Conjunctiva/sclera: Conjunctivae normal.   Pulmonary:      Effort: Pulmonary effort is normal.   Abdominal:      General: There is no distension. Palpations: Abdomen is soft. Tenderness: There is no abdominal tenderness. Musculoskeletal:         General: Normal range of motion. Cervical back: Neck supple. Lymphadenopathy:      Upper Body:      Right upper body: No supraclavicular or axillary adenopathy. Left upper body: No supraclavicular or axillary adenopathy. Lower Body: No right inguinal adenopathy. No left inguinal adenopathy. Neurological:      Mental Status: She is alert and oriented to person, place, and time. Skin:     General: Skin is warm and dry. Psychiatric:         Mood and Affect: Mood normal.         Behavior: Behavior normal.         Thought Content: Thought content normal.         Judgment: Judgment normal.   Vitals and nursing note reviewed.

## 2023-10-20 ENCOUNTER — ANNUAL EXAM (OUTPATIENT)
Dept: OBGYN CLINIC | Facility: CLINIC | Age: 48
End: 2023-10-20

## 2023-10-20 VITALS
WEIGHT: 228.2 LBS | HEIGHT: 67 IN | DIASTOLIC BLOOD PRESSURE: 80 MMHG | SYSTOLIC BLOOD PRESSURE: 112 MMHG | BODY MASS INDEX: 35.82 KG/M2

## 2023-10-20 DIAGNOSIS — Z12.11 ENCOUNTER FOR COLORECTAL CANCER SCREENING: ICD-10-CM

## 2023-10-20 DIAGNOSIS — Z12.12 ENCOUNTER FOR COLORECTAL CANCER SCREENING: ICD-10-CM

## 2023-10-20 DIAGNOSIS — Z12.31 ENCOUNTER FOR SCREENING MAMMOGRAM FOR MALIGNANT NEOPLASM OF BREAST: ICD-10-CM

## 2023-10-20 DIAGNOSIS — Z01.419 ENCOUNTER FOR ANNUAL ROUTINE GYNECOLOGICAL EXAMINATION: Primary | ICD-10-CM

## 2023-10-20 PROBLEM — Z86.19 HX OF COLD SORES: Status: ACTIVE | Noted: 2021-07-29

## 2023-10-20 NOTE — ASSESSMENT & PLAN NOTE
Normal findings on routine gyn exam  ASCCP guidelines reviewed. Pap due 2025  STD screening offered. Pt declined - low risk  SBE encouraged. CBE annually. Mammo UTD. GI referral for colon cancer screening   Perineal hygiene reviewed. Kegel exercises recommended. Daily exercise and healthy diet with adequate calcium and vitamin D encouraged.    F/U Annually and PRN

## 2023-10-20 NOTE — PATIENT INSTRUCTIONS
Kegel Exercises for Women   AMBULATORY CARE:   Kegel exercises  help strengthen your pelvic muscles. Pelvic muscles hold your pelvic organs, such as your bladder and uterus, in place. Kegel exercises help prevent or control certain conditions, such as urine incontinence (leakage) or uterine prolapse. Call your doctor or physical therapist if:   You cannot feel your muscles tighten or relax. You continue to leak urine. You have questions or concerns about your condition or care. Use the correct muscles:  Pelvic muscles are the muscles you use to control urine flow. To target these muscles, stop and start the flow of urine several times. This will help you become familiar with how it feels to tighten and relax these muscles. How to do Kegel exercises:   Get into a comfortable position. You may lie down, stand up, or sit down to do these exercises. When you first try to do these exercises, it may be easier if you lie down. Tighten or squeeze your pelvic muscles slowly. It may feel like you are trying to hold back urine or gas. Hold this position for 3 seconds. Relax for 3 seconds. Repeat this cycle 10 times. Do not hold your breath when you do Kegel exercises. Keep your stomach, back, and leg muscles relaxed. Do 10 sets of Kegel exercises, at least 3 times a day. When you know how to do Kegel exercises, use different positions. This will help to strengthen your pelvic muscles as much as possible. You can do these exercises while you lie on the floor, watch TV, or while you stand. Tighten your pelvic muscles before you sneeze, cough, or lift to prevent urine leakage. You may notice improved bladder control within about 6 weeks. Follow up with your doctor or physical therapist as directed:  Write down your questions so you remember to ask them during your visits.   © Copyright [facility] 2023 Information is for End User's use only and may not be sold, redistributed or otherwise used for commercial purposes. The above information is an  only. It is not intended as medical advice for individual conditions or treatments. Talk to your doctor, nurse or pharmacist before following any medical regimen to see if it is safe and effective for you. Breast Self Exam for Women   AMBULATORY CARE:   A breast self-exam (BSE)  is a way to check your breasts for lumps and other changes. Regular BSEs can help you know how your breasts normally look and feel. Most breast lumps or changes are not cancer, but you should always have them checked by a healthcare provider. Why you should do a BSE:  Breast cancer is the most common type of cancer in women. Even if you have mammograms, you may still want to do a BSE regularly. If you know how your breasts normally feel and look, it may help you know when to contact your healthcare provider. Mammograms can miss some cancers. You may find a lump during a BSE that did not show up on a mammogram.  When you should do a BSE:  If you have periods, you may want to do your BSE 1 week after your period ends. This is the time when your breasts may be the least swollen, lumpy, or tender. You can do regular BSEs even if you are breastfeeding or have breast implants. Call your doctor if:   You find any lumps or changes in your breasts. You have breast pain or fluid coming from your nipples. You have questions or concerns about your condition or care. How to do a BSE:       Look at your breasts in a mirror. Look at the size and shape of each breast and nipple. Check for swelling, lumps, dimpling, scaly skin, or other skin changes. Look for nipple changes, such as a nipple that is painful or beginning to pull inward. Gently squeeze both nipples and check to see if fluid (that is not breast milk) comes out of them. If you find any of these or other breast changes, contact your healthcare provider.  Check your breasts while you sit or  the following 3 positions:    [place] your arms down at your sides. Raise your hands and join them behind your head. Put firm pressure with your hands on your hips. Bend slightly forward while you look at your breasts in the mirror. Lie down and feel your breasts. When you lie down, your breast tissue spreads out evenly over your chest. This makes it easier for you to feel for lumps and anything that may not be normal for your breasts. Do a BSE on one breast at a time. Place a small pillow or towel under your left shoulder. Put your left arm behind your head. Use the 3 middle fingers of your right hand. Use your fingertip pads, on the top of your fingers. Your fingertip pad is the most sensitive part of your finger. Use small circles to feel your breast tissue. Use your fingertip pads to make dime-sized, overlapping circles on your breast and armpits. Use light, medium, and firm pressure. First, press lightly. Second, press with medium pressure to feel a little deeper into the breast. Last, use firm pressure to feel deep within your breast.    Examine your entire breast area. Examine the breast area from above the breast to below the breast where you feel only ribs. Make small circles with your fingertips, starting in the middle of your armpit. Make circles going up and down the breast area. Continue toward your breast and all the way across it. Examine the area from your armpit all the way over to the middle of your chest (breastbone). Stop at the middle of your chest.    Move the pillow or towel to your right shoulder, and put your right arm behind your head. Use the 3 fingertip pads of your left hand, and repeat the above steps to do a BSE on your right breast.  What else you can do to check for breast problems or cancer:  Talk to your healthcare provider about mammograms. A mammogram is an x-ray of your breasts to screen for breast cancer or other problems.  Your provider can tell you the benefits and risks of mammograms. The first mammogram is usually at age 39 or 48. Your provider may recommend you start at 36 or younger if your risk for breast cancer is high. Mammograms usually continue every 1 to 2 years until age 76. Follow up with your doctor as directed:  Write down your questions so you remember to ask them during your visits. © Copyright [employer] 2023 Information is for End User's use only and may not be sold, redistributed or otherwise used for commercial purposes. The above information is an  only. It is not intended as medical advice for individual conditions or treatments. Talk to your doctor, nurse or pharmacist before following any medical regimen to see if it is safe and effective for you.

## 2023-12-19 PROBLEM — Z01.419 ENCOUNTER FOR ANNUAL ROUTINE GYNECOLOGICAL EXAMINATION: Status: RESOLVED | Noted: 2023-10-20 | Resolved: 2023-12-19

## 2024-05-09 ENCOUNTER — APPOINTMENT (OUTPATIENT)
Age: 49
End: 2024-05-09
Payer: COMMERCIAL

## 2024-05-09 ENCOUNTER — OFFICE VISIT (OUTPATIENT)
Age: 49
End: 2024-05-09
Payer: COMMERCIAL

## 2024-05-09 VITALS
SYSTOLIC BLOOD PRESSURE: 114 MMHG | TEMPERATURE: 98.2 F | OXYGEN SATURATION: 97 % | DIASTOLIC BLOOD PRESSURE: 74 MMHG | WEIGHT: 233.8 LBS | BODY MASS INDEX: 36.7 KG/M2 | RESPIRATION RATE: 18 BRPM | HEIGHT: 67 IN | HEART RATE: 106 BPM

## 2024-05-09 DIAGNOSIS — Z00.00 ADULT GENERAL MEDICAL EXAMINATION: ICD-10-CM

## 2024-05-09 DIAGNOSIS — Z11.59 NEED FOR HEPATITIS C SCREENING TEST: ICD-10-CM

## 2024-05-09 DIAGNOSIS — Z00.00 ADULT GENERAL MEDICAL EXAMINATION: Primary | ICD-10-CM

## 2024-05-09 PROBLEM — Z86.19 HX OF COLD SORES: Status: RESOLVED | Noted: 2021-07-29 | Resolved: 2024-05-09

## 2024-05-09 LAB
ERYTHROCYTE [DISTWIDTH] IN BLOOD BY AUTOMATED COUNT: 13.6 % (ref 11.6–15.1)
HCT VFR BLD AUTO: 38.5 % (ref 34.8–46.1)
HGB BLD-MCNC: 12.2 G/DL (ref 11.5–15.4)
MCH RBC QN AUTO: 25.1 PG (ref 26.8–34.3)
MCHC RBC AUTO-ENTMCNC: 31.7 G/DL (ref 31.4–37.4)
MCV RBC AUTO: 79 FL (ref 82–98)
PLATELET # BLD AUTO: 332 THOUSANDS/UL (ref 149–390)
PMV BLD AUTO: 9.5 FL (ref 8.9–12.7)
RBC # BLD AUTO: 4.87 MILLION/UL (ref 3.81–5.12)
WBC # BLD AUTO: 6.65 THOUSAND/UL (ref 4.31–10.16)

## 2024-05-09 PROCEDURE — 36415 COLL VENOUS BLD VENIPUNCTURE: CPT

## 2024-05-09 PROCEDURE — 86803 HEPATITIS C AB TEST: CPT

## 2024-05-09 PROCEDURE — 85027 COMPLETE CBC AUTOMATED: CPT

## 2024-05-09 PROCEDURE — 99396 PREV VISIT EST AGE 40-64: CPT | Performed by: INTERNAL MEDICINE

## 2024-05-09 RX ORDER — MULTIVITAMIN
1 CAPSULE ORAL DAILY
COMMUNITY

## 2024-05-09 NOTE — PROGRESS NOTES
ADULT ANNUAL PHYSICAL  [facility] [place]    NAME: Kelsey Fan  AGE: 48 y.o. SEX: female  : 1975     DATE: 2024     Assessment and Plan:     Problem List Items Addressed This Visit    None  Visit Diagnoses       Adult general medical examination    -  Primary    Relevant Orders    CBC    Lipid Panel with Direct LDL reflex    Comprehensive metabolic panel    Need for hepatitis C screening test        Relevant Orders    Hepatitis C Antibody          Immunizations and preventive care screenings were discussed with patient today. Appropriate education was printed on patient's after visit summary.  Check updated labs  Has mammogram scheduled  Get colonoscopy scheduled for the summer  UTD with immunizations    Counseling:  Alcohol/drug use: discussed moderation in alcohol intake, the recommendations for healthy alcohol use, and avoidance of illicit drug use.  Dental Health: discussed importance of regular tooth brushing, flossing, and dental visits.  Injury prevention: discussed safety/seat belts, safety helmets, smoke detectors, carbon dioxide detectors, and smoking near bedding or upholstery.  Sexual health: discussed sexually transmitted diseases, partner selection, use of condoms, avoidance of unintended pregnancy, and contraceptive alternatives.  Exercise: the importance of regular exercise/physical activity was discussed. Recommend exercise 3-5 times per week for at least 30 minutes.       Depression Screening and Follow-up Plan: Patient was screened for depression during today's encounter. They screened negative with a PHQ-2 score of 0.       History of Present Illness:     Adult Annual Physical   Patient here for a comprehensive physical exam. The patient reports no significant problems with her health. Has struggled with her weight over the years. Used to see Dr. Huynh many years ago. Works at [employer] and is UTD with her  immunizations.    Diet and Physical Activity  Diet/Nutrition:  no specific diet .   Exercise: walking 3-4 times a week (6000 steps/day average).      Depression Screening  PHQ-2/9 Depression Screening    Little interest or pleasure in doing things: 0 - not at all  Feeling down, depressed, or hopeless: 0 - not at all  PHQ-2 Score: 0  PHQ-2 Interpretation: Negative depression screen       General Health  Sleep: sleeps well.   Hearing: normal - bilateral.  Vision: goes for regular eye exams. Has reading glasses.  Dental: regular dental visits and brushes teeth twice daily.       /GYN Health  Follows with gynecology? yes      Review of Systems:     Review of Systems   Constitutional:  Negative for appetite change, chills, fatigue and fever.   HENT:  Negative for congestion, hearing loss, postnasal drip, rhinorrhea, sore throat, tinnitus and trouble swallowing.    Eyes:  Negative for pain, discharge, redness and visual disturbance.   Respiratory:  Negative for cough, chest tightness, shortness of breath and wheezing.    Cardiovascular:  Negative for chest pain, palpitations and leg swelling.   Gastrointestinal:  Negative for abdominal distention, abdominal pain, blood in stool, constipation, diarrhea, nausea and vomiting.   Endocrine: Negative for cold intolerance, heat intolerance, polydipsia, polyphagia and polyuria.   Genitourinary:  Negative for difficulty urinating, dysuria, frequency, hematuria and urgency.   Musculoskeletal:  Negative for arthralgias, back pain, gait problem, joint swelling, myalgias, neck pain and neck stiffness.   Skin:  Negative for color change and rash.   Neurological:  Negative for dizziness, tremors, seizures, syncope, speech difficulty, weakness, light-headedness, numbness and headaches.   Hematological:  Negative for adenopathy. Does not bruise/bleed easily.   Psychiatric/Behavioral:  Negative for agitation, behavioral problems, confusion, hallucinations, sleep disturbance and suicidal  ideas. The patient is not nervous/anxious.       Past Medical History:     Past Medical History:   Diagnosis Date    Asthma     HSV infection       Past Surgical History:     Past Surgical History:   Procedure Laterality Date     SECTION      CHOLECYSTECTOMY        Social History:     Social History     Socioeconomic History    Marital status: /Civil Union     Spouse name: None    Number of children: None    Years of education: None    Highest education level: None   Occupational History    None   Tobacco Use    Smoking status: Never    Smokeless tobacco: Never   Vaping Use    Vaping status: Never Used   Substance and Sexual Activity    Alcohol use: Yes    Drug use: No    Sexual activity: Not Currently     Partners: Male   Other Topics Concern    None   Social History Narrative    None     Social Determinants of Health     Financial Resource Strain: Not on file   Food Insecurity: Not on file   Transportation Needs: Not on file   Physical Activity: Insufficiently Active (2019)    Exercise Vital Sign     Days of Exercise per Week: 1 day     Minutes of Exercise per Session: 20 min   Stress: Not on file (2021)   Social Connections: Not on file   Intimate Partner Violence: Low Risk  (2021)    Received from [facility]    Intimate Partner Violence     Insults You: Not on file     Threatens You: Not on file     Screams at You: Not on file     Physically Hurt: Not on file     Intimate Partner Violence Score: Not on file   Housing Stability: Not on file      Family History:     Family History   Problem Relation Age of Onset    Breast cancer Neg Hx     Colon cancer Neg Hx     Ovarian cancer Neg Hx     Uterine cancer Neg Hx     Cervical cancer Neg Hx       Current Medications:     Current Outpatient Medications   Medication Sig Dispense Refill    Multiple Vitamin (multivitamin) capsule Take 1 capsule by mouth daily      valACYclovir (VALTREX) 500 mg tablet Take 1 tablet (500 mg total) by mouth  "2 (two) times a day for 3 days prn (Patient taking differently: Take 500 mg by mouth if needed prn) 90 tablet 1     No current facility-administered medications for this visit.      Allergies:     No Known Allergies     Physical Exam:     /74 (BP Location: Left arm, Patient Position: Sitting, Cuff Size: Large)   Pulse (!) 106   Temp 98.2 °F (36.8 °C) (Tympanic)   Resp 18   Ht 5' 7\" (1.702 m)   Wt 106 kg (233 lb 12.8 oz)   SpO2 97%   BMI 36.62 kg/m²     Physical Exam  Constitutional:       General: She is not in acute distress.     Appearance: She is obese. She is not ill-appearing.   HENT:      Right Ear: Tympanic membrane, ear canal and external ear normal. There is no impacted cerumen.      Left Ear: Tympanic membrane, ear canal and external ear normal. There is no impacted cerumen.   Cardiovascular:      Rate and Rhythm: Normal rate and regular rhythm.      Heart sounds: No murmur heard.  Pulmonary:      Effort: Pulmonary effort is normal. No respiratory distress.      Breath sounds: No wheezing.   Abdominal:      General: Bowel sounds are normal. There is no distension.      Tenderness: There is no abdominal tenderness.   Musculoskeletal:      Right lower leg: No edema.      Left lower leg: No edema.   Neurological:      General: No focal deficit present.      Mental Status: She is alert. Mental status is at baseline.      Cranial Nerves: No cranial nerve deficit.   Psychiatric:         Mood and Affect: Mood normal.         Behavior: Behavior normal.        Ovidio Jensen DO  [facility]"

## 2024-05-10 LAB — HCV AB SER QL: NORMAL

## 2024-06-01 ENCOUNTER — APPOINTMENT (EMERGENCY)
Dept: RADIOLOGY | Facility: HOSPITAL | Age: 49
End: 2024-06-01
Payer: COMMERCIAL

## 2024-06-01 ENCOUNTER — HOSPITAL ENCOUNTER (EMERGENCY)
Facility: HOSPITAL | Age: 49
Discharge: HOME | End: 2024-06-01
Attending: EMERGENCY MEDICINE
Payer: COMMERCIAL

## 2024-06-01 VITALS
SYSTOLIC BLOOD PRESSURE: 167 MMHG | HEART RATE: 101 BPM | RESPIRATION RATE: 18 BRPM | OXYGEN SATURATION: 98 % | TEMPERATURE: 98.3 F | DIASTOLIC BLOOD PRESSURE: 83 MMHG

## 2024-06-01 DIAGNOSIS — T14.8XXA CONTUSION: Primary | ICD-10-CM

## 2024-06-01 PROCEDURE — 73060 X-RAY EXAM OF HUMERUS: CPT

## 2024-06-01 PROCEDURE — 73560 X-RAY EXAM OF KNEE 1 OR 2: CPT

## 2024-06-01 PROCEDURE — 73110 X-RAY EXAM OF WRIST: CPT

## 2024-06-01 PROCEDURE — 99283 EMERGENCY DEPT VISIT LOW MDM: CPT

## 2024-06-01 PROCEDURE — 99284 EMERGENCY DEPT VISIT MOD MDM: CPT | Performed by: EMERGENCY MEDICINE

## 2024-06-01 RX ORDER — ACETAMINOPHEN 325 MG/1
975 TABLET ORAL ONCE
Status: COMPLETED | OUTPATIENT
Start: 2024-06-01 | End: 2024-06-01

## 2024-06-01 RX ORDER — IBUPROFEN 400 MG/1
800 TABLET ORAL ONCE
Status: COMPLETED | OUTPATIENT
Start: 2024-06-01 | End: 2024-06-01

## 2024-06-01 RX ADMIN — ACETAMINOPHEN 975 MG: 325 TABLET, FILM COATED ORAL at 22:25

## 2024-06-01 RX ADMIN — IBUPROFEN 800 MG: 400 TABLET, FILM COATED ORAL at 22:25

## 2024-06-02 NOTE — ED PROVIDER NOTES
History  Chief Complaint   Patient presents with    Arm Pain     Pt arrived  ambulatory with c/o left arm pain, pt fell last night and the pain is not getting any better. Positive pulses. Pt also adds that her left knee hurts and she would like that checked out     Patient is a 48-year-old female with past medical history of asthma presenting with arm pain following fall.  Patient had a fall yesterday, trip and fall and landed on her left arm.  Denies any head trauma or LOC.  Denies any headache, chest pain, back pain, abdominal pain, neck pain, numbness or tingling but has pain to her left arm mostly with lifting and pushing with her hand today.  States it feels most so to her elbow.  Also states that she scraped her left knee.  Denies any dizziness prior to or after fall.  Took Advil last night and last at 2 PM, 8 hours ago with some relief.        Prior to Admission Medications   Prescriptions Last Dose Informant Patient Reported? Taking?   Multiple Vitamin (multivitamin) capsule  Self Yes No   Sig: Take 1 capsule by mouth daily   valACYclovir (VALTREX) 500 mg tablet  Self No No   Sig: Take 1 tablet (500 mg total) by mouth 2 (two) times a day for 3 days prn   Patient taking differently: Take 500 mg by mouth if needed prn      Facility-Administered Medications: None       Past Medical History:   Diagnosis Date    Asthma     HSV infection        Past Surgical History:   Procedure Laterality Date     SECTION      CHOLECYSTECTOMY         Family History   Problem Relation Age of Onset    Breast cancer Neg Hx     Colon cancer Neg Hx     Ovarian cancer Neg Hx     Uterine cancer Neg Hx     Cervical cancer Neg Hx      I have reviewed and agree with the history as documented.    E-Cigarette/Vaping    E-Cigarette Use Never User      E-Cigarette/Vaping Substances    Nicotine No     THC No     CBD No     Flavoring No     Other No     Unknown No      Social History     Tobacco Use    Smoking status: Never    Smokeless  tobacco: Never   Vaping Use    Vaping status: Never Used   Substance Use Topics    Alcohol use: Yes    Drug use: No       Review of Systems   All other systems reviewed and are negative.      Physical Exam  Physical Exam  Vitals reviewed.   Constitutional:       General: She is not in acute distress.     Appearance: Normal appearance. She is not ill-appearing.   HENT:      Mouth/Throat:      Mouth: Mucous membranes are moist.   Eyes:      Conjunctiva/sclera: Conjunctivae normal.   Cardiovascular:      Rate and Rhythm: Normal rate.      Pulses: Normal pulses.   Pulmonary:      Effort: Pulmonary effort is normal.   Musculoskeletal:         General: Tenderness present. No swelling. Normal range of motion.      Cervical back: Neck supple. No tenderness.      Comments: Abrasion to the left knee with full range of motion with mild tenderness intact sensation, motor, mild tenderness about the left elbow with mild restriction in extension at the elbow with intact distal motor, sensation, pulses, pain with range of motion of the shoulder without edema or erythema, full range of motion.     Skin:     General: Skin is warm and dry.      Capillary Refill: Capillary refill takes less than 2 seconds.   Neurological:      General: No focal deficit present.      Mental Status: She is alert.      Sensory: No sensory deficit.      Motor: No weakness.   Psychiatric:         Mood and Affect: Mood normal.         Vital Signs  ED Triage Vitals [06/01/24 2154]   Temperature Pulse Respirations Blood Pressure SpO2   98.3 °F (36.8 °C) 101 18 167/83 98 %      Temp Source Heart Rate Source Patient Position - Orthostatic VS BP Location FiO2 (%)   Oral Monitor Sitting Left arm --      Pain Score       --           Vitals:    06/01/24 2154   BP: 167/83   Pulse: 101   Patient Position - Orthostatic VS: Sitting         Visual Acuity      ED Medications  Medications   ibuprofen (MOTRIN) tablet 800 mg (has no administration in time range)    acetaminophen (TYLENOL) tablet 975 mg (has no administration in time range)       Diagnostic Studies  Results Reviewed       None                   XR elbow 3+ vw LEFT    (Results Pending)   XR shoulder 2+ views LEFT    (Results Pending)   XR humerus LEFT    (Results Pending)   XR knee 1 or 2 views left    (Results Pending)              Procedures  Procedures         ED Course                               SBIRT 22yo+      Flowsheet Row Most Recent Value   Initial Alcohol Screen: US AUDIT-C     1. How often do you have a drink containing alcohol? 0 Filed at: 06/01/2024 2156   2. How many drinks containing alcohol do you have on a typical day you are drinking?  0 Filed at: 06/01/2024 2156   3b. FEMALE Any Age, or MALE 65+: How often do you have 4 or more drinks on one occassion? 0 Filed at: 06/01/2024 2156   Audit-C Score 0 Filed at: 06/01/2024 2156   ANDREAS: How many times in the past year have you...    Used an illegal drug or used a prescription medication for non-medical reasons? Never Filed at: 06/01/2024 2156                      Medical Decision Making  Patient is a 48-year-old female past medical history of asthma presenting with arm pain following fall.  Patient is well-appearing at bedside with stable vitals and in no acute distress.  She is neurovascularly intact But has some diminished extension at shoulder due to pain, no abrasions, erythema, edema or increased warmth, mild pain with range of motion of the shoulder, and abrasion to left knee.  Will obtain imaging to assess for fracture, give pain control and reassess.    Amount and/or Complexity of Data Reviewed  Radiology: ordered.    Risk  OTC drugs.  Prescription drug management.             Disposition  Final diagnoses:   None     ED Disposition       None          Follow-up Information    None         Patient's Medications   Discharge Prescriptions    No medications on file       No discharge procedures on file.    PDMP Review         Value Time  User    PDMP Reviewed  Yes 1/23/2020  9:56 AM KAROLINA Odonnell            ED Provider  Electronically Signed by             Nafisa Carvalho DO  06/02/24 0189

## 2024-07-02 ENCOUNTER — APPOINTMENT (OUTPATIENT)
Dept: RADIOLOGY | Facility: CLINIC | Age: 49
End: 2024-07-02
Payer: COMMERCIAL

## 2024-07-02 ENCOUNTER — OFFICE VISIT (OUTPATIENT)
Dept: OBGYN CLINIC | Facility: CLINIC | Age: 49
End: 2024-07-02
Payer: COMMERCIAL

## 2024-07-02 VITALS
HEIGHT: 67 IN | BODY MASS INDEX: 36.57 KG/M2 | DIASTOLIC BLOOD PRESSURE: 87 MMHG | SYSTOLIC BLOOD PRESSURE: 144 MMHG | HEART RATE: 85 BPM | WEIGHT: 233 LBS

## 2024-07-02 DIAGNOSIS — M25.522 ELBOW PAIN, LEFT: ICD-10-CM

## 2024-07-02 DIAGNOSIS — S52.135A CLOSED NONDISPLACED FRACTURE OF NECK OF LEFT RADIUS, INITIAL ENCOUNTER: Primary | ICD-10-CM

## 2024-07-02 DIAGNOSIS — M25.512 LEFT SHOULDER PAIN, UNSPECIFIED CHRONICITY: ICD-10-CM

## 2024-07-02 DIAGNOSIS — M77.8 TENDONITIS OF WRIST, LEFT: ICD-10-CM

## 2024-07-02 PROCEDURE — 73030 X-RAY EXAM OF SHOULDER: CPT

## 2024-07-02 PROCEDURE — 73080 X-RAY EXAM OF ELBOW: CPT

## 2024-07-02 PROCEDURE — 99204 OFFICE O/P NEW MOD 45 MIN: CPT | Performed by: ORTHOPAEDIC SURGERY

## 2024-07-02 NOTE — PROGRESS NOTES
Patient Name:  Kelsey Fan  MRN:  [id number]    Assessment & Plan     1. Closed nondisplaced fracture of neck of left radius, initial encounter  -     Ambulatory Referral to Occupational Therapy; Future  2. Left shoulder pain, unspecified chronicity  -     XR shoulder 2+ vw left; Future; Expected date: 07/02/2024  3. Elbow pain, left  -     XR elbow 3+ vw left; Future; Expected date: 07/02/2024  4. Tendonitis of wrist, left  -     Durable Medical Equipment  -     Ambulatory Referral to Occupational Therapy; Future      Left elbow radial neck fracture, Left wrist extensor tendonitis  X-rays reviewed in office today with patient  In regards to Left elbow and wrist, discussed nonoperative treatment with outpatient OT to work on gentle range of motion and stretching, light lifting as tolerated. Briefly discussed treatment planning with radial neck fractures including a period of sling use, gentle ROM and then incorporating into outpatient OT. At this time, patient is few weeks s/p injury and would avoid sling use at this time.   Offered wrist brace to decrease wrist pain during increased function. Patient declined brace.   In regards to the Left shoulder, advised patient likely rotator cuff tendonitis etiology. Advised patient to continue gentle overhead range of motion.    Continue OTC topical and oral analgesics as needed for pain relief  Follow up in 4-6 weeks for reevaluation of Left elbow and wrist and new Left elbow x-rays upon arrival    Chief Complaint     Left shoulder pain    History of the Present Illness     Kelsey Fan is a RHD 48 y.o. female with Left shoulder pain after a fall on 05/31/2024 after falling onto the Left side after tripping on uneven ground. She did have pain after the fall and noticed she could not extend the elbow. She was evaluated in the ED with x-rays. She admits she is feeling much better, but with residual elbow pain, decreased range of motion and wrist pain. She has  "pain with pushing herself up while sitting.     Review of Systems     Review of Systems   Constitutional:  Negative for chills and fever.   HENT:  Negative for congestion.    Respiratory:  Negative for cough, chest tightness and shortness of breath.    Cardiovascular:  Negative for chest pain and palpitations.   Gastrointestinal:  Negative for abdominal pain.   Endocrine: Negative for cold intolerance and heat intolerance.   Neurological:  Negative for syncope.   Psychiatric/Behavioral:  Negative for confusion.        Physical Exam     /87   Pulse 85   Ht 5' 7\" (1.702 m)   Wt 106 kg (233 lb)   BMI 36.49 kg/m²     Left Shoulder:   Active range of motion   170 degrees forward flexion  170 degrees abduction  70 degrees external rotation   1 level restriction internal rotation    There is no tenderness present over the shoulder.   Forward flexion testing 5/5  External rotation testing 5/5  Internal rotation testing 5/5  The patient is neurovascularly intact distally in the extremity.      Left Elbow    Tenderness to palpation over radial head  Active range of motion  5 to 130 degrees  Lacking approximately 10 degrees of supination and approximately 5 degrees of pronation  Full composite fist  No tenderness over the distal radius, ulnar styloid or anatomic snuff box  4+/5 finger flexion strength  Positive Finklestein's test without tenderness over 1st dorsal compartment  70 degrees wrist flexion and extension  No pain with resisted wrist extension or flexion  Neurovascularly intact distally        Eyes:  Anicteric sclerae.  Neck:  Supple.  Lungs:  Normal respiratory effort.  Cardiovascular:  Capillary refill is less than 2 seconds.  Skin:  Intact without erythema.  Neurologic:  Sensation grossly intact to light touch.  Psychiatric:  Mood and affect are appropriate.    Data Review     I have personally reviewed pertinent films in PACS, and my interpretation follows:    X-rays taken 07/02/2024 of Left shoulder " independently reviewed and demonstrate no acute fracture, dislocation or significant degenerative change.    X-rays taken 2024 of Left elbow independently reviewed and demonstrate nondisplaced radial neck fracture.      X-rays taken 2024 of Left wrist demonstrate no acute fracture or dislocation. No significant degenerative change noted.     Past Medical History:   Diagnosis Date    Asthma     HSV infection        Past Surgical History:   Procedure Laterality Date     SECTION      CHOLECYSTECTOMY         No Known Allergies    Current Outpatient Medications on File Prior to Visit   Medication Sig Dispense Refill    Multiple Vitamin (multivitamin) capsule Take 1 capsule by mouth daily      valACYclovir (VALTREX) 500 mg tablet Take 1 tablet (500 mg total) by mouth 2 (two) times a day for 3 days prn (Patient taking differently: Take 500 mg by mouth if needed prn) 90 tablet 1     No current facility-administered medications on file prior to visit.       Social History     Tobacco Use    Smoking status: Never    Smokeless tobacco: Never   Vaping Use    Vaping status: Never Used   Substance Use Topics    Alcohol use: Yes    Drug use: No       Family History   Problem Relation Age of Onset    Breast cancer Neg Hx     Colon cancer Neg Hx     Ovarian cancer Neg Hx     Uterine cancer Neg Hx     Cervical cancer Neg Hx              Procedures Performed     Procedures  None       Anand Pemberton DO

## 2024-07-30 ENCOUNTER — OFFICE VISIT (OUTPATIENT)
Dept: OBGYN CLINIC | Facility: CLINIC | Age: 49
End: 2024-07-30
Payer: COMMERCIAL

## 2024-07-30 ENCOUNTER — EVALUATION (OUTPATIENT)
Dept: OCCUPATIONAL THERAPY | Facility: CLINIC | Age: 49
End: 2024-07-30
Payer: COMMERCIAL

## 2024-07-30 ENCOUNTER — APPOINTMENT (OUTPATIENT)
Dept: RADIOLOGY | Facility: CLINIC | Age: 49
End: 2024-07-30
Payer: COMMERCIAL

## 2024-07-30 VITALS
HEIGHT: 67 IN | SYSTOLIC BLOOD PRESSURE: 116 MMHG | DIASTOLIC BLOOD PRESSURE: 82 MMHG | BODY MASS INDEX: 36.57 KG/M2 | WEIGHT: 233 LBS | HEART RATE: 94 BPM

## 2024-07-30 DIAGNOSIS — S52.135A CLOSED NONDISPLACED FRACTURE OF NECK OF LEFT RADIUS, INITIAL ENCOUNTER: ICD-10-CM

## 2024-07-30 DIAGNOSIS — S52.135D CLOSED NONDISPLACED FRACTURE OF NECK OF LEFT RADIUS WITH ROUTINE HEALING, SUBSEQUENT ENCOUNTER: Primary | ICD-10-CM

## 2024-07-30 DIAGNOSIS — M77.8 TENDONITIS OF WRIST, LEFT: ICD-10-CM

## 2024-07-30 DIAGNOSIS — M25.512 LEFT SHOULDER PAIN, UNSPECIFIED CHRONICITY: ICD-10-CM

## 2024-07-30 PROCEDURE — 97165 OT EVAL LOW COMPLEX 30 MIN: CPT | Performed by: OCCUPATIONAL THERAPIST

## 2024-07-30 PROCEDURE — 99213 OFFICE O/P EST LOW 20 MIN: CPT | Performed by: ORTHOPAEDIC SURGERY

## 2024-07-30 PROCEDURE — 97110 THERAPEUTIC EXERCISES: CPT | Performed by: OCCUPATIONAL THERAPIST

## 2024-07-30 PROCEDURE — 73080 X-RAY EXAM OF ELBOW: CPT

## 2024-07-30 RX ORDER — NORETHINDRONE ACETATE AND ETHINYL ESTRADIOL, ETHINYL ESTRADIOL AND FERROUS FUMARATE 1MG-10(24)
KIT ORAL
COMMUNITY

## 2024-07-30 NOTE — PROGRESS NOTES
"Patient Name:  Kelsey Fan  MRN:  [id number]    Assessment & Plan     1. Closed nondisplaced fracture of neck of left radius with routine healing, subsequent encounter  -     XR elbow 3+ vw left; Future; Expected date: 07/30/2024      Left closed nondisplaced radial neck fracture  X-rays reviewed with patient  Overall, patient doing well s/p injury 06/01/2024  Encouraged patient to continue outpatient OT to work on obtaining remainder of supination of Left upper extremity  Continue with home exercises   Avoid heavy lifting at this time, but may progress with light strengthening with therapy.   Follow up 6-8 weeks, no new imaging unless symptoms worsen    History of the Present Illness   Kelsey Fan is a 48 y.o. female with Left closed nondisplaced radial neck fracture sustained 06/01/2024. Today, patient reports she is feeling imprved from previous appointment. She was able to get into PT today for ROM and exercises. She does notice some stiffness in the elbow with supination. Overall, she is doing well since injury.         Review of Systems     Review of Systems   Constitutional:  Negative for chills and fever.   HENT:  Negative for congestion.    Respiratory:  Negative for cough, chest tightness and shortness of breath.    Cardiovascular:  Negative for chest pain and palpitations.   Gastrointestinal:  Negative for abdominal pain.   Endocrine: Negative for cold intolerance and heat intolerance.   Neurological:  Negative for syncope.   Psychiatric/Behavioral:  Negative for confusion.        Physical Exam     /82   Pulse 94   Ht 5' 7\" (1.702 m)   Wt 106 kg (233 lb)   BMI 36.49 kg/m²     Left elbow  Minimal to no tenderness over fracture site of radial neck  Range of motion from 0 to approximately 130 degrees of flexion without pain  Lacking approximately 20-30 degrees of supination compared to contralateral elbow  Full composite fist   Neurovascularly intact distally    Data Review     I have " personally reviewed pertinent films in PACS, and my interpretation follows.    X-rays taken 07/30/2024 of Left elbow independently reviewed and demonstrate maintenance of fracture of radial neck without change in alignment from previous images.     Social History     Tobacco Use    Smoking status: Never    Smokeless tobacco: Never   Vaping Use    Vaping status: Never Used   Substance Use Topics    Alcohol use: Yes    Drug use: No           Procedures  None     Lorena Arango PA-C

## 2024-07-30 NOTE — PROGRESS NOTES
OT Evaluation     Today's date: 2024  Patient name: Kelsey Fan  : 1975  MRN: [id number]  Referring provider: Lorena Arango PA-C  Dx:   Encounter Diagnosis     ICD-10-CM    1. Tendonitis of wrist, left  M77.8 Ambulatory Referral to Occupational Therapy      2. Closed nondisplaced fracture of neck of left radius, initial encounter  S52.135A Ambulatory Referral to Occupational Therapy                     Assessment  Impairments: abnormal or restricted ROM, activity intolerance, impaired physical strength, lacks appropriate home exercise program and pain with function  Symptom irritability: low    Assessment details: Kelsey is a 47 y/o RHD female presenting s/p left radial neck fracture.  She fell onto her left arm, sideways, in late May.  The next day on 24, she went to the ED and xray was taken.  (-) resultes that day.  Due to continued pain with function and inability to push onto left arm, she sought the care of an orthopedic.  Xrays repeated with (+) hairline fracture of radial neck.  Currently she is working as a , full time, full duty.  She has difficulty using left with force, weight bearing onto left arm, and donning a backpack.    Examination reveals motion restriction in supination.   Normal motion of FA pronation, elbow E/F, and wrist motion.   strength slightly decreased compared to right.  Mild tenderness over lateral elbow.  No sensory complaints.  Evaluation is of low complexity, due to minimal comorbidities and stable clinical presentation.      Pt demonstrates good tolerance of therapy today and was provided with a written HEP focusing on passive stretching, weight bearing, FA and  PREs.  She was instructed to perform exercises daily. The patient demonstrates HEP instructions appropriately, verbalizes understanding, and is in agreement with the written HEP.  Pt will benefit from skilled OT intervention to progress motion, strength,  and allow return to PLOF.          Understanding of Dx/Px/POC: excellent     Prognosis: excellent    Goals  STG   Achieve at least 10 degrees  increased in extension   Achieve at least 10 degrees increase in flexion   Reduce pain at rest to less than 2/10  LTG   Achieve functional elbow range to at least 125   Achieve elbow extension lag to less than -15   Achieve functional strength to allow carrying groceries   Achieve pain level to less than 2/10 with light cooking tasks   Achieve FOTO goal established an IE.        Plan  Patient would benefit from: skilled occupational therapy  Planned modality interventions: thermotherapy: hydrocollator packs    Planned therapy interventions: IASTM, joint mobilization, kinesiology taping, manual therapy, massage, activity modification, nerve gliding, orthotic fitting/training, patient/caregiver education, strengthening, stretching, therapeutic activities, therapeutic exercise, home exercise program, graded exercise, graded activity, functional ROM exercises and fine motor coordination training    Plan of Care beginning date: 2024  Plan of Care expiration date: 9/10/2024  Treatment plan discussed with: patient      Subjective Evaluation    History of Present Illness  Mechanism of injury: trauma          Not a recurrent problem   Quality of life: excellent    Patient Goals  Patient goals for therapy: increased motion, increased strength, independence with ADLs/IADLs and return to sport/leisure activities    Pain  Current pain ratin  Aggravating factors: lifting (heavier tasks, pushing off arm)  Progression: improved    Social Support    Employment status: working ()  Hand dominance: right      Diagnostic Tests  X-ray: abnormal  Treatments  No previous or current treatments      Objective     Observations     Left Elbow   Negative for edema.     Left Wrist/Hand   Negative for edema.     Tenderness     Left Elbow   No tenderness in the lateral epicondyle and radial head.     Left Wrist/Hand    No tenderness in the first dorsal compartment, CMC joint, lateral epicondyle and distal radioulnar joint.     Neurological Testing     Sensation     Wrist/Hand   Left   Intact: light touch    Additional Neurological Details  No sensory complaints    Active Range of Motion     Left Elbow   Flexion: 130 degrees   Extension: 0 degrees   Forearm supination: 60 degrees   Forearm pronation: 90 degrees     Right Elbow   Flexion: 130 degrees   Extension: 0 degrees   Forearm supination: 90 degrees   Forearm pronation: 90 degrees     Left Wrist   Normal active range of motion    Strength/Myotome Testing     Left Elbow   Forearm supination: 4+  Forearm pronation: 4+    Left Wrist/Hand   Wrist extension: 5  Wrist flexion: 5    Additional Strength Details  Pk #2  L  54 lbs,  R  69.6 lbs.     Tests     Left Wrist/Hand   Negative Tinel's sign (radial tunnel).              Precautions:  left radial neck fracture; late May 7/30/24  Access Code: [email or web address]  URL: [email or web address]  Date: 07/30/2024  Prepared by: Gena Irene    Exercises  - Forearm Pronation and Supination with Hammer  - 3 x daily - 7 x weekly - 1 sets - 10 reps  - Seated Wrist Supination Stretch  - 3 x daily - 7 x weekly - 1 sets - 10 reps  - Wrist Flexor Stretch on Table  - 3 x daily - 7 x weekly - 1 sets - 10 reps  - Putty Squeezes  - 1 x daily - 7 x weekly - 3 sets - 10 reps    POC expires Unit limit Auth Expiration date PT/OT/ST + Visit Limit?   9/10 3 TBD 60         Dx L Rad neck fx     Dr Michael MENDEZ @        Date 7/30            Visit             Manuals                                                                 Neuro Re-Ed                                                                                                        Ther Ex             HEP FA PROM, PRE; putty, weight bear                                                                                                       Ther Activity                                        Gait Training                                       Modalities

## 2024-07-30 NOTE — PROGRESS NOTES
Patient Name:  Kelsey Fan  MRN:  [id number]    Assessment & Plan     1. Left shoulder pain, unspecified chronicity  -     XR elbow 3+ vw left; Future; Expected date: 07/30/2024      Left elbow radial neck fracture, Left wrist extensor tendonitis  X-rays reviewed in office today with patient  In regards to Left elbow and wrist, discussed nonoperative treatment with outpatient OT to work on gentle range of motion and stretching, light lifting as tolerated.\  Offered wrist brace to decrease wrist pain during increased function. Patient declined brace.   In regards to the Left shoulder, advised patient likely rotator cuff tendonitis etiology. Advised patient to continue gentle overhead range of motion.    Continue OTC topical and oral analgesics as needed for pain relief  Follow up in 4-6 weeks for reevaluation of Left elbow and wrist and new Left elbow x-rays upon arrival    Chief Complaint     Left shoulder pain    History of the Present Illness     Kelsey Fan is a RHD 48 y.o. female with Left shoulder pain after a fall on 05/31/2024 after falling onto the Left side after tripping on uneven ground. She admits she is feeling much better, but with residual elbow pain, decreased range of motion and wrist pain. She has pain with pushing herself up while sitting. She initiated OT today and will continue with their plan of care until discharged. She was offered wrist brace at her last visit 4 weeks ago, however declined this.     Review of Systems     Review of Systems   Constitutional:  Negative for chills and fever.   HENT:  Negative for congestion.    Respiratory:  Negative for cough, chest tightness and shortness of breath.    Cardiovascular:  Negative for chest pain and palpitations.   Gastrointestinal:  Negative for abdominal pain.   Endocrine: Negative for cold intolerance and heat intolerance.   Neurological:  Negative for syncope.   Psychiatric/Behavioral:  Negative for confusion.        Physical  Exam     There were no vitals taken for this visit.    Left Shoulder:   Active range of motion   170 degrees forward flexion  170 degrees abduction  70 degrees external rotation   1 level restriction internal rotation    There is no tenderness present over the shoulder.   Forward flexion testing 5/5  External rotation testing 5/5  Internal rotation testing 5/5  The patient is neurovascularly intact distally in the extremity.      Left Elbow    Tenderness to palpation over radial head  Active range of motion  5 to 130 degrees  Lacking approximately 10 degrees of supination and approximately 5 degrees of pronation  Full composite fist  No tenderness over the distal radius, ulnar styloid or anatomic snuff box  4+/5 finger flexion strength  Positive Finklestein's test without tenderness over 1st dorsal compartment  70 degrees wrist flexion and extension  No pain with resisted wrist extension or flexion  Neurovascularly intact distally        Eyes:  Anicteric sclerae.  Neck:  Supple.  Lungs:  Normal respiratory effort.  Cardiovascular:  Capillary refill is less than 2 seconds.  Skin:  Intact without erythema.  Neurologic:  Sensation grossly intact to light touch.  Psychiatric:  Mood and affect are appropriate.    Data Review     I have personally reviewed pertinent films in PACS, and my interpretation follows:    X-rays taken 2024 of Left shoulder independently reviewed and demonstrate no acute fracture, dislocation or significant degenerative change.    X-rays taken 2024 of Left elbow independently reviewed and demonstrate nondisplaced radial neck fracture.      X-rays taken 2024 of Left wrist demonstrate no acute fracture or dislocation. No significant degenerative change noted.     Past Medical History:   Diagnosis Date    Asthma     HSV infection        Past Surgical History:   Procedure Laterality Date     SECTION      CHOLECYSTECTOMY         No Known Allergies    Current Outpatient  Medications on File Prior to Visit   Medication Sig Dispense Refill    Multiple Vitamin (multivitamin) capsule Take 1 capsule by mouth daily      valACYclovir (VALTREX) 500 mg tablet Take 1 tablet (500 mg total) by mouth 2 (two) times a day for 3 days prn (Patient taking differently: Take 500 mg by mouth if needed prn) 90 tablet 1     No current facility-administered medications on file prior to visit.       Social History     Tobacco Use    Smoking status: Never    Smokeless tobacco: Never   Vaping Use    Vaping status: Never Used   Substance Use Topics    Alcohol use: Yes    Drug use: No       Family History   Problem Relation Age of Onset    Breast cancer Neg Hx     Colon cancer Neg Hx     Ovarian cancer Neg Hx     Uterine cancer Neg Hx     Cervical cancer Neg Hx              Procedures Performed     Procedures  None       Arielle Nobles

## 2024-10-01 ENCOUNTER — OFFICE VISIT (OUTPATIENT)
Age: 49
End: 2024-10-01
Payer: COMMERCIAL

## 2024-10-01 ENCOUNTER — PREP FOR PROCEDURE (OUTPATIENT)
Age: 49
End: 2024-10-01

## 2024-10-01 VITALS
WEIGHT: 231 LBS | DIASTOLIC BLOOD PRESSURE: 80 MMHG | HEART RATE: 94 BPM | OXYGEN SATURATION: 98 % | SYSTOLIC BLOOD PRESSURE: 122 MMHG | HEIGHT: 67 IN | BODY MASS INDEX: 36.26 KG/M2

## 2024-10-01 DIAGNOSIS — Z12.11 SCREENING FOR COLON CANCER: Primary | ICD-10-CM

## 2024-10-01 PROCEDURE — 99203 OFFICE O/P NEW LOW 30 MIN: CPT | Performed by: INTERNAL MEDICINE

## 2024-10-01 NOTE — PROGRESS NOTES
[facility]      Chief Complaint: Need for screening    HPI:  Kelsey Fan is a 48 y.o.  female who presents with need for initial colon cancer screening.  No prior history of colonoscopy.  Patient has no abdominal pain, change in bowel habits, change in stool caliber, melena, hematochezia, rectal bleeding, tenesmus, or weight loss.  No shortness of breath or chest pain.  No other symptomatology.      Review of Systems:   Constitutional: No fever or chills, feels well, no tiredness, no recent weight gain or weight loss.   HENT: No complaints of earache, no hearing loss, no nosebleeds, no nasal discharge, no sore throat, no hoarseness.    Eyes: No complaints of eye pain, no red eyes, no discharge from eyes, no itchy eyes.  Cardiovascular: No complaints of slow heart rate, no fast heart rate, no chest pain, no palpitations, no leg claudication, no lower extremity edema.   Respiratory: No complaints of shortness of breath, no wheezing, no cough, no SOB on exertion, no orthopnea.   Gastrointestinal: As noted in HPI  Genitourinary: No complaints of dysuria, no incontinence, no hesitancy, no nocturia.   Musculoskeletal: No complaints of arthralgia, no myalgias, no joint swelling or stiffness, no limb pain or swelling.   Neurological: No complaints of headache, no confusion, no convulsions, no numbness or tingling, no dizziness or fainting, no limb weakness, no difficulty walking.    Skin: No complaints of skin rash or skin lesions, no itching, no skin wound, no dry skin.    Hematological/Lymphatic: No complaints of swollen glands, does not bleed easy.   Allergic/Immunologic: No immunocompromised state.  Endocrine:  No complaints of polyuria, no polydipsia.   Psychiatric/Behavioral: is not suicidal, no sleep disturbances, no anxiety or depression, no change in personality, no emotional problems.       Historical Information   Past Medical History:   Diagnosis Date    Asthma     HSV  "infection      Past Surgical History:   Procedure Laterality Date     SECTION      CHOLECYSTECTOMY       Social History   Social History     Substance and Sexual Activity   Alcohol Use Yes     Social History     Substance and Sexual Activity   Drug Use No     Social History     Tobacco Use   Smoking Status Never   Smokeless Tobacco Never     Family History   Problem Relation Age of Onset    Breast cancer Neg Hx     Colon cancer Neg Hx     Ovarian cancer Neg Hx     Uterine cancer Neg Hx     Cervical cancer Neg Hx          Current Medications: has a current medication list which includes the following prescription(s): multivitamin, lo loestrin fe, and valacyclovir.        Vital Signs: /80   Pulse 94   Ht 5' 7\" (1.702 m)   Wt 105 kg (231 lb)   SpO2 98%   BMI 36.18 kg/m²       Physical Exam:   Constitutional  General Appearance: No acute distress, well appearing and well nourished  Head  Normocephalic  Eyes  Conjunctivae and lids: No swelling, erythema, or discharge.    Pupils and irises: Equal, round and reactive to light.   Ears, Nose, Mouth, and Throat  External inspection of ears and nose: Normal  Nasal mucosa, septum and turbinates: Normal without edema or erythema/   Oropharynx: Normal with no erythema, edema, exudate or lesions.   Neck  Normal range of motion. Neck supple.   Cardiovascular  Auscultation of the heart: Normal rate and rhythm, normal S1 and S2 without murmurs.  Examination of the extremities for edema and/or varicosities: Normal  Pulmonary/Chest  Respiratory effort: No increased work of breathing or signs of respiratory distress.   Auscultation of lungs: Clear to auscultation, equal breath sounds bilaterally, no wheezes, rales, no rhonchi.   Abdomen  Abdomen: Non-tender, no masses.   Liver and spleen: No hepatomegaly or splenomegaly.   Musculoskeletal  Gait and station: normal.  Digits and Nails: normal without clubbing or cyanosis.  Inspection/palpation of joints, bones, and " muscles: Normal  Neurological  No nystagmus or asterixis.   Skin  Skin and subcutaneous tissue: Normal without rashes or lesions.   Lymphatic  Palpation of the lymph nodes in neck: No lymphadenopathy.   Psychiatric  Orientation to person, place and time: Normal.  Mood and affect: Normal.         Labs:  Lab Results   Component Value Date    ALT 15 02/25/2019    AST 9 02/25/2019    BUN 13 02/25/2019    CALCIUM 8.4 02/25/2019     02/25/2019    CO2 25 02/25/2019    CREATININE 0.98 02/25/2019    HDL 43 02/25/2019    HCT 38.5 05/09/2024    HGB 12.2 05/09/2024    HGBA1C 5.4 02/25/2019     05/09/2024    K 4.0 02/25/2019    TRIG 89 02/25/2019    WBC 6.65 05/09/2024         X-Rays & Procedures:   No orders to display           ______________________________________________________________________      Assessment & Plan:     Diagnoses and all orders for this visit:    Screening for colon cancer  -     Colonoscopy; Future      Patient will be scheduled for colonoscopy.  Further recommendations will depend on study results    I obtained informed consent from the patient. The risks/benefits/alternatives of the procedure were discussed with the patient. Risks included, but not limited to, infection, bleeding, perforation, injury to organs in the abdomen, missed lesion and incomplete procedure were discussed. Patient was agreeable and electronic consent was signed.

## 2024-10-01 NOTE — PATIENT INSTRUCTIONS
Scheduled date of colonoscopy (as of today): 10/28/24  Physician performing colonoscopy: Alicia  Location of colonoscopy: [place]  Bowel prep reviewed with patient: Miralax  Instructions reviewed with patient by: Gabi WHITEHEAD  Clearances:

## 2024-10-30 ENCOUNTER — TELEPHONE (OUTPATIENT)
Age: 49
End: 2024-10-30

## 2024-10-30 NOTE — TELEPHONE ENCOUNTER
Patient called in regarding needing to reschedule yearly with [facility]. There is nothing available in any location till February and patient does not want to wait that long. Her appointment was previously rescheduled already and last yearly 10/20/23. Please follow up if we can get her in sooner. Thank you

## 2024-11-12 ENCOUNTER — TELEPHONE (OUTPATIENT)
Dept: OBGYN CLINIC | Facility: MEDICAL CENTER | Age: 49
End: 2024-11-12

## 2024-11-18 ENCOUNTER — ANESTHESIA EVENT (OUTPATIENT)
Dept: GASTROENTEROLOGY | Facility: HOSPITAL | Age: 49
End: 2024-11-18
Payer: COMMERCIAL

## 2024-11-18 ENCOUNTER — HOSPITAL ENCOUNTER (OUTPATIENT)
Dept: GASTROENTEROLOGY | Facility: HOSPITAL | Age: 49
Setting detail: OUTPATIENT SURGERY
Discharge: HOME | End: 2024-11-18
Attending: INTERNAL MEDICINE
Payer: COMMERCIAL

## 2024-11-18 ENCOUNTER — ANESTHESIA (OUTPATIENT)
Dept: GASTROENTEROLOGY | Facility: HOSPITAL | Age: 49
End: 2024-11-18
Payer: COMMERCIAL

## 2024-11-18 VITALS
RESPIRATION RATE: 16 BRPM | DIASTOLIC BLOOD PRESSURE: 87 MMHG | WEIGHT: 233.69 LBS | TEMPERATURE: 98.5 F | HEART RATE: 80 BPM | OXYGEN SATURATION: 100 % | HEIGHT: 67 IN | SYSTOLIC BLOOD PRESSURE: 151 MMHG | BODY MASS INDEX: 36.68 KG/M2

## 2024-11-18 DIAGNOSIS — Z12.11 SCREENING FOR COLON CANCER: ICD-10-CM

## 2024-11-18 LAB
EXT PREGNANCY TEST URINE: NEGATIVE
EXT. CONTROL: NORMAL

## 2024-11-18 PROCEDURE — 45385 COLONOSCOPY W/LESION REMOVAL: CPT | Performed by: INTERNAL MEDICINE

## 2024-11-18 PROCEDURE — 81025 URINE PREGNANCY TEST: CPT | Performed by: ANESTHESIOLOGY

## 2024-11-18 PROCEDURE — 88305 TISSUE EXAM BY PATHOLOGIST: CPT | Performed by: SPECIALIST

## 2024-11-18 RX ORDER — PROPOFOL 10 MG/ML
INJECTION, EMULSION INTRAVENOUS AS NEEDED
Status: DISCONTINUED | OUTPATIENT
Start: 2024-11-18 | End: 2024-11-18

## 2024-11-18 RX ORDER — LIDOCAINE HYDROCHLORIDE 10 MG/ML
INJECTION, SOLUTION EPIDURAL; INFILTRATION; INTRACAUDAL; PERINEURAL AS NEEDED
Status: DISCONTINUED | OUTPATIENT
Start: 2024-11-18 | End: 2024-11-18

## 2024-11-18 RX ORDER — SODIUM CHLORIDE, SODIUM LACTATE, POTASSIUM CHLORIDE, CALCIUM CHLORIDE 600; 310; 30; 20 MG/100ML; MG/100ML; MG/100ML; MG/100ML
100 INJECTION, SOLUTION INTRAVENOUS CONTINUOUS
Status: DISCONTINUED | OUTPATIENT
Start: 2024-11-18 | End: 2024-11-22 | Stop reason: HOSPADM

## 2024-11-18 RX ORDER — SODIUM CHLORIDE, SODIUM LACTATE, POTASSIUM CHLORIDE, CALCIUM CHLORIDE 600; 310; 30; 20 MG/100ML; MG/100ML; MG/100ML; MG/100ML
INJECTION, SOLUTION INTRAVENOUS CONTINUOUS PRN
Status: DISCONTINUED | OUTPATIENT
Start: 2024-11-18 | End: 2024-11-18

## 2024-11-18 RX ADMIN — PROPOFOL 100 MG: 10 INJECTION, EMULSION INTRAVENOUS at 10:39

## 2024-11-18 RX ADMIN — SODIUM CHLORIDE, SODIUM LACTATE, POTASSIUM CHLORIDE, AND CALCIUM CHLORIDE: .6; .31; .03; .02 INJECTION, SOLUTION INTRAVENOUS at 10:35

## 2024-11-18 RX ADMIN — PROPOFOL 30 MG: 10 INJECTION, EMULSION INTRAVENOUS at 10:52

## 2024-11-18 RX ADMIN — PROPOFOL 50 MG: 10 INJECTION, EMULSION INTRAVENOUS at 10:43

## 2024-11-18 RX ADMIN — PROPOFOL 30 MG: 10 INJECTION, EMULSION INTRAVENOUS at 10:47

## 2024-11-18 RX ADMIN — PROPOFOL 30 MG: 10 INJECTION, EMULSION INTRAVENOUS at 10:45

## 2024-11-18 RX ADMIN — SODIUM CHLORIDE, SODIUM LACTATE, POTASSIUM CHLORIDE, AND CALCIUM CHLORIDE 100 ML/HR: .6; .31; .03; .02 INJECTION, SOLUTION INTRAVENOUS at 10:00

## 2024-11-18 RX ADMIN — PROPOFOL 30 MG: 10 INJECTION, EMULSION INTRAVENOUS at 10:49

## 2024-11-18 RX ADMIN — PROPOFOL 30 MG: 10 INJECTION, EMULSION INTRAVENOUS at 10:41

## 2024-11-18 RX ADMIN — LIDOCAINE HYDROCHLORIDE 50 MG: 10 INJECTION, SOLUTION EPIDURAL; INFILTRATION; INTRACAUDAL; PERINEURAL at 10:39

## 2024-11-18 NOTE — ANESTHESIA POSTPROCEDURE EVALUATION
Post-Op Assessment Note    CV Status:  Stable  Pain Score: 0    Pain management: adequate       Mental Status:  Alert and awake   Hydration Status:  Euvolemic   PONV Controlled:  Controlled   Airway Patency:  Patent     Post Op Vitals Reviewed: Yes    No anethesia notable event occurred.    Staff: CRNA           Last Filed PACU Vitals:  Vitals Value Taken Time   Temp 98.5 °F (36.9 °C) 11/18/24 1058   Pulse 84 11/18/24 1058   /71 11/18/24 1058   Resp 16 11/18/24 1058   SpO2 98 % 11/18/24 1058       Modified Pamela:  Activity: 1 (11/18/2024 10:58 AM)  Respiration: 2 (11/18/2024 10:58 AM)  Circulation: 2 (11/18/2024 10:58 AM)  Consciousness: 1 (11/18/2024 10:58 AM)  Oxygen Saturation: 2 (11/18/2024 10:58 AM)  Modified Pamela Score: 8 (11/18/2024 10:58 AM)

## 2024-11-18 NOTE — H&P
"History and Physical -  [facility]  Kelsey Fan 48 y.o. female MRN: [id number]                  HPI: Kelsey Fan is a 48 y.o. year old female who presents for colonoscopy for initial average risk screening      REVIEW OF SYSTEMS: Per the HPI, and otherwise unremarkable.    Historical Information   Past Medical History:   Diagnosis Date    Asthma     HSV infection      Past Surgical History:   Procedure Laterality Date     SECTION      CHOLECYSTECTOMY       Social History   Social History     Substance and Sexual Activity   Alcohol Use Yes     Social History     Substance and Sexual Activity   Drug Use No     Social History     Tobacco Use   Smoking Status Never   Smokeless Tobacco Never     Family History   Problem Relation Age of Onset    Breast cancer Neg Hx     Colon cancer Neg Hx     Ovarian cancer Neg Hx     Uterine cancer Neg Hx     Cervical cancer Neg Hx        Meds/Allergies     Not in a hospital admission.    No Known Allergies    Objective     Blood pressure 126/80, pulse 92, temperature 98.6 °F (37 °C), temperature source Temporal, resp. rate 18, height 5' 7\" (1.702 m), weight 106 kg (233 lb 11 oz), last menstrual period 10/29/2024, SpO2 96%, not currently breastfeeding.      PHYSICAL EXAM    Gen: NAD  CV: RRR  CHEST: Clear  ABD: soft, NT/ND  EXT: no edema  Neuro: AAO      ASSESSMENT/PLAN:  This is a 48 y.o. year old female here for initial average risk screening    PLAN:   Procedure: Colonoscopy          "

## 2024-11-21 PROCEDURE — 88305 TISSUE EXAM BY PATHOLOGIST: CPT | Performed by: SPECIALIST

## 2025-05-12 ENCOUNTER — OFFICE VISIT (OUTPATIENT)
Age: 50
End: 2025-05-12
Payer: COMMERCIAL

## 2025-05-12 VITALS
BODY MASS INDEX: 37.73 KG/M2 | SYSTOLIC BLOOD PRESSURE: 124 MMHG | OXYGEN SATURATION: 97 % | WEIGHT: 240.4 LBS | HEIGHT: 67 IN | TEMPERATURE: 97.2 F | RESPIRATION RATE: 18 BRPM | HEART RATE: 98 BPM | DIASTOLIC BLOOD PRESSURE: 78 MMHG

## 2025-05-12 DIAGNOSIS — E66.813 CLASS 3 SEVERE OBESITY WITHOUT SERIOUS COMORBIDITY IN ADULT: ICD-10-CM

## 2025-05-12 DIAGNOSIS — Z00.00 ADULT GENERAL MEDICAL EXAMINATION: Primary | ICD-10-CM

## 2025-05-12 DIAGNOSIS — R29.818 SUSPECTED SLEEP APNEA: ICD-10-CM

## 2025-05-12 PROCEDURE — 99396 PREV VISIT EST AGE 40-64: CPT | Performed by: INTERNAL MEDICINE

## 2025-05-12 NOTE — PROGRESS NOTES
Adult Annual Physical  Name: Kelsey Fan      : 1975      MRN: [id number]  Encounter Provider: Ovidio Jensen DO  Encounter Date: 2025   Encounter department: [facility] [place]    :  Assessment & Plan  Adult general medical examination    Orders:  •  Lipid Panel with Direct LDL reflex; Future  •  CBC; Future  •  Comprehensive metabolic panel; Future    Class 3 severe obesity without serious comorbidity in adult    Weight Loss Tips:     Weight loss is NOT easy for anyone.  Achieving weight loss goals have been a challenge for many people and a quick fad diet will NOT work the way the product or commercials promises us it will work.  Life changes are long lasting.      Weight loss comes in time and with changes that are personalized for you, NOT the same for everyone.       7% weight loss of your body weight will MAKE a HUGE difference in your health status including improvement to Diabetes and high blood pressure management.       Here are some quick calculations for 7% weight loss:      If you weigh 180 pounds then GOAL 7% weight loss would be 12.5 pounds.  If you weigh 190 pounds then GOAL 7% weight loss would be 13.0 pounds.  If you weigh 200 pounds then GOAL 7% weight loss would be 14.0 pounds.  If you weigh 210 pounds then GOAL 7% weight loss would be 14.7 pounds.  If you weigh 220 pounds then GOAL 7% weight loss would be 15.5 pounds.  If you weigh 230 pounds then GOAL 7% weight loss would be 16.0 pounds.  If you weigh 240 pounds then GOAL 7% weight loss would be 17.0 pounds.     If you weight over 250 pounds then a weight loss of >17 pounds is recommended.       Quick tips:  1. Track food intake with an rhoda or tracker like www.AdRocket.com, sparkpeople.com, loseit.com, calorieking.com.   Reduce calorie intake by 500 a day to help lose weight but do NOT eat less than 1400 calories a day as this will turn off your metabolism and result in no weight loss at all.        2. Higher protein and lower carbohydrate intake works well for many but do NOT eliminate ONE food group completely as this is difficult to maintain in the long-term and will result in gaining back the weight quickly.       3. Track your steps with rhoda on phone, Heart rhoda on iphone, or a pedometer such as fitbit or jawbone Up 24 to help you reach 10,000 steps daily.       4.  Many patients do well with a carbohydrate level daily that is less than 200 g of carbs.  In addition the protein level should be over 75 g of protein in the full day.      Orders:  •  TSH, 3rd generation with Free T4 reflex; Future    Suspected sleep apnea    Highly suspect sleep apnea. Getting un-refreshing sleep + crowded oropharynx + loud snoring + witnessed apnea.    Orders:  •  Ambulatory Referral to Sleep Medicine; Future      Preventive Screenings:  - Diabetes Screening: orders placed  - Cholesterol Screening: orders placed   - Hepatitis C screening: screening up-to-date   - Cervical cancer screening: screening up-to-date   - Breast cancer screening: screening up-to-date   - Colon cancer screening: screening up-to-date   - Lung cancer screening: screening not indicated     Immunizations:  - Immunizations due: Tdap    Counseling/Anticipatory Guidance:  - Alcohol: discussed moderation in alcohol intake and recommendations for healthy alcohol use.   - Drug use: discussed harms of illicit drug use and how it can negatively impact mental/physical health.   - Tobacco use: discussed harms of tobacco use and management options for quitting.   - Dental health: discussed importance of regular tooth brushing, flossing, and dental visits.   - Sexual health: discussed sexually transmitted diseases, partner selection, use of condoms, avoidance of unintended pregnancy, and contraceptive alternatives.   - Diet: discussed recommendations for a healthy/well-balanced diet.   - Exercise: the importance of regular exercise/physical activity was discussed.  Recommend exercise 3-5 times per week for at least 30 minutes.   - Injury prevention: discussed safety/seat belts, safety helmets, smoke detectors, carbon monoxide detectors, and smoking near bedding or upholstery.       Depression Screening and Follow-up Plan: Patient was screened for depression during today's encounter. They screened negative with a PHQ-2 score of 0.          History of Present Illness     Adult Annual Physical:  Patient presents for annual physical. She admits to feeling tired a lot. Even if she reportedly gets enough sleep, she does not feel well rested. Her children have told her about loud snoring and apneic episodes. She states they tell her she doesn't snore all the time.     Diet and Physical Activity:  - Diet/Nutrition: no special diet.  - Exercise: walking.    Depression Screening:  - PHQ-2 Score: 0    General Health:  - Sleep: sleeps well.  - Hearing: normal hearing bilateral ears.  - Vision: no vision problems.  - Dental: regular dental visits and brushes teeth twice daily.    /GYN Health:  - Follows with GYN: no.     Review of Systems   Constitutional:  Positive for fatigue. Negative for activity change, appetite change, chills and fever.   HENT:  Negative for congestion, sinus pressure, sneezing, sore throat and tinnitus.         Snoring   Respiratory:  Positive for apnea. Negative for cough, chest tightness, shortness of breath and wheezing.    Cardiovascular:  Negative for chest pain, palpitations and leg swelling.   Gastrointestinal:  Negative for abdominal distention, abdominal pain, blood in stool, constipation, diarrhea, nausea and vomiting.   Musculoskeletal:  Negative for arthralgias, back pain, gait problem, joint swelling and myalgias.   Skin:  Negative for rash and wound.   Neurological:  Negative for dizziness, weakness, light-headedness, numbness and headaches.   Psychiatric/Behavioral:  Negative for behavioral problems, confusion, hallucinations, sleep disturbance and  "suicidal ideas. The patient is not nervous/anxious.      Medical History Reviewed by provider this encounter:  Tobacco  Allergies  Meds  Problems  Med Hx  Surg Hx  Fam Hx         Objective   /78 (BP Location: Left arm, Patient Position: Sitting, Cuff Size: Large)   Pulse 98   Temp (!) 97.2 °F (36.2 °C) (Tympanic)   Resp 18   Ht 5' 7\" (1.702 m)   Wt 109 kg (240 lb 6.4 oz)   SpO2 97%   BMI 37.65 kg/m²     Physical Exam  Constitutional:       General: She is not in acute distress.     Appearance: She is obese. She is not ill-appearing.   HENT:      Right Ear: Tympanic membrane, ear canal and external ear normal. There is no impacted cerumen.      Left Ear: Tympanic membrane, ear canal and external ear normal. There is no impacted cerumen.      Mouth/Throat:      Comments: Crowded oropharynx  Cardiovascular:      Rate and Rhythm: Normal rate and regular rhythm.      Heart sounds: No murmur heard.  Pulmonary:      Effort: Pulmonary effort is normal. No respiratory distress.      Breath sounds: No wheezing.   Abdominal:      General: Abdomen is protuberant. Bowel sounds are normal. There is no distension.      Tenderness: There is no abdominal tenderness.   Musculoskeletal:      Right lower leg: No edema.      Left lower leg: No edema.   Neurological:      Mental Status: She is alert.       Ovidio Jensen,    "

## 2025-05-12 NOTE — PATIENT INSTRUCTIONS
"Patient Education     Routine physical for adults   The Basics   Written by the doctors and editors at [facility]   What is a physical? -- A physical is a routine visit, or \"check-up,\" with your doctor. You might also hear it called a \"wellness visit\" or \"preventive visit.\"  During each visit, the doctor will:   Ask about your physical and mental health   Ask about your habits, behaviors, and lifestyle   Do an exam   Give you vaccines if needed   Talk to you about any medicines you take   Give advice about your health   Answer your questions  Getting regular check-ups is an important part of taking care of your health. It can help your doctor find and treat any problems you have. But it's also important for preventing health problems.  A routine physical is different from a \"sick visit.\" A sick visit is when you see a doctor because of a health concern or problem. Since physicals are scheduled ahead of time, you can think about what you want to ask the doctor.  How often should I get a physical? -- It depends on your age and health. In general, for people age 21 years and older:   If you are younger than 50 years, you might be able to get a physical every 3 years.   If you are 50 years or older, your doctor might recommend a physical every year.  If you have an ongoing health condition, like diabetes or high blood pressure, your doctor will probably want to see you more often.  What happens during a physical? -- In general, each visit will include:   Physical exam - The doctor or nurse will check your height, weight, heart rate, and blood pressure. They will also look at your eyes and ears. They will ask about how you are feeling and whether you have any symptoms that bother you.   Medicines - It's a good idea to bring a list of all the medicines you take to each doctor visit. Your doctor will talk to you about your medicines and answer any questions. Tell them if you are having any side effects that bother you. You " "should also tell them if you are having trouble paying for any of your medicines.   Habits and behaviors - This includes:   Your diet   Your exercise habits   Whether you smoke, drink alcohol, or use drugs   Whether you are sexually active   Whether you feel safe at home  Your doctor will talk to you about things you can do to improve your health and lower your risk of health problems. They will also offer help and support. For example, if you want to quit smoking, they can give you advice and might prescribe medicines. If you want to improve your diet or get more physical activity, they can help you with this, too.   Lab tests, if needed - The tests you get will depend on your age and situation. For example, your doctor might want to check your:   Cholesterol   Blood sugar   Iron level   Vaccines - The recommended vaccines will depend on your age, health, and what vaccines you already had. Vaccines are very important because they can prevent certain serious or deadly infections.   Discussion of screening - \"Screening\" means checking for diseases or other health problems before they cause symptoms. Your doctor can recommend screening based on your age, risk, and preferences. This might include tests to check for:   Cancer, such as breast, prostate, cervical, ovarian, colorectal, prostate, lung, or skin cancer   Sexually transmitted infections, such as chlamydia and gonorrhea   Mental health conditions like depression and anxiety  Your doctor will talk to you about the different types of screening tests. They can help you decide which screenings to have. They can also explain what the results might mean.   Answering questions - The physical is a good time to ask the doctor or nurse questions about your health. If needed, they can refer you to other doctors or specialists, too.  Adults older than 65 years often need other care, too. As you get older, your doctor will talk to you about:   How to prevent falling at " home   Hearing or vision tests   Memory testing   How to take your medicines safely   Making sure that you have the help and support you need at home  All topics are updated as new evidence becomes available and our peer review process is complete.  This topic retrieved from [employer] on: May 02, 2024.  Topic 403867 Version 1.0  Release: 32.4.3 - C32.122  © 2024 UpToDate, Inc. and/or its affiliates. All rights reserved.  Consumer Information Use and Disclaimer   Disclaimer: This generalized information is a limited summary of diagnosis, treatment, and/or medication information. It is not meant to be comprehensive and should be used as a tool to help the user understand and/or assess potential diagnostic and treatment options. It does NOT include all information about conditions, treatments, medications, side effects, or risks that may apply to a specific patient. It is not intended to be medical advice or a substitute for the medical advice, diagnosis, or treatment of a health care provider based on the health care provider's examination and assessment of a patient's specific and unique circumstances. Patients must speak with a health care provider for complete information about their health, medical questions, and treatment options, including any risks or benefits regarding use of medications. This information does not endorse any treatments or medications as safe, effective, or approved for treating a specific patient. UpToDate, Inc. and its affiliates disclaim any warranty or liability relating to this information or the use thereof.The use of this information is governed by the Terms of Use, available at https://www.woltersEntropySoftuwer.com/en/know/clinical-effectiveness-terms. 2024© UpToDate, Inc. and its affiliates and/or licensors. All rights reserved.  Copyright   © 2024 Up[employer]Date, [employer]. and/or its affiliates. All rights reserved.

## 2025-05-13 ENCOUNTER — TRANSCRIBE ORDERS (OUTPATIENT)
Dept: SLEEP CENTER | Facility: CLINIC | Age: 50
End: 2025-05-13

## 2025-05-13 DIAGNOSIS — R29.818 SUSPECTED SLEEP APNEA: Primary | ICD-10-CM

## 2025-08-18 ENCOUNTER — APPOINTMENT (OUTPATIENT)
Age: 50
End: 2025-08-18
Payer: COMMERCIAL

## 2025-08-18 DIAGNOSIS — E66.813 CLASS 3 SEVERE OBESITY WITHOUT SERIOUS COMORBIDITY IN ADULT: ICD-10-CM

## 2025-08-18 DIAGNOSIS — Z00.00 ADULT GENERAL MEDICAL EXAMINATION: ICD-10-CM

## 2025-08-18 LAB
ERYTHROCYTE [DISTWIDTH] IN BLOOD BY AUTOMATED COUNT: 14.1 % (ref 11.6–15.1)
HCT VFR BLD AUTO: 39.5 % (ref 34.8–46.1)
HGB BLD-MCNC: 12.7 G/DL (ref 11.5–15.4)
MCH RBC QN AUTO: 26.2 PG (ref 26.8–34.3)
MCHC RBC AUTO-ENTMCNC: 32.2 G/DL (ref 31.4–37.4)
MCV RBC AUTO: 82 FL (ref 82–98)
PLATELET # BLD AUTO: 342 THOUSANDS/UL (ref 149–390)
PMV BLD AUTO: 9.6 FL (ref 8.9–12.7)
RBC # BLD AUTO: 4.84 MILLION/UL (ref 3.81–5.12)
WBC # BLD AUTO: 6.83 THOUSAND/UL (ref 4.31–10.16)

## 2025-08-18 PROCEDURE — 84439 ASSAY OF FREE THYROXINE: CPT

## 2025-08-18 PROCEDURE — 36415 COLL VENOUS BLD VENIPUNCTURE: CPT

## 2025-08-18 PROCEDURE — 84443 ASSAY THYROID STIM HORMONE: CPT

## 2025-08-18 PROCEDURE — 80061 LIPID PANEL: CPT

## 2025-08-18 PROCEDURE — 80053 COMPREHEN METABOLIC PANEL: CPT

## 2025-08-18 PROCEDURE — 85027 COMPLETE CBC AUTOMATED: CPT

## 2025-08-19 LAB
ALBUMIN SERPL BCG-MCNC: 3.9 G/DL (ref 3.5–5)
ALP SERPL-CCNC: 86 U/L (ref 34–104)
ALT SERPL W P-5'-P-CCNC: 9 U/L (ref 7–52)
ANION GAP SERPL CALCULATED.3IONS-SCNC: 7 MMOL/L (ref 4–13)
AST SERPL W P-5'-P-CCNC: 11 U/L (ref 13–39)
BILIRUB SERPL-MCNC: 0.68 MG/DL (ref 0.2–1)
BUN SERPL-MCNC: 12 MG/DL (ref 5–25)
CALCIUM SERPL-MCNC: 8.8 MG/DL (ref 8.4–10.2)
CHLORIDE SERPL-SCNC: 104 MMOL/L (ref 96–108)
CHOLEST SERPL-MCNC: 154 MG/DL (ref ?–200)
CO2 SERPL-SCNC: 26 MMOL/L (ref 21–32)
CREAT SERPL-MCNC: 0.8 MG/DL (ref 0.6–1.3)
GFR SERPL CREATININE-BSD FRML MDRD: 87 ML/MIN/1.73SQ M
GLUCOSE P FAST SERPL-MCNC: 84 MG/DL (ref 65–99)
HDLC SERPL-MCNC: 49 MG/DL
LDLC SERPL CALC-MCNC: 93 MG/DL (ref 0–100)
POTASSIUM SERPL-SCNC: 4.3 MMOL/L (ref 3.5–5.3)
PROT SERPL-MCNC: 7 G/DL (ref 6.4–8.4)
SODIUM SERPL-SCNC: 137 MMOL/L (ref 135–147)
T4 FREE SERPL-MCNC: 1.36 NG/DL (ref 0.61–1.12)
TRIGL SERPL-MCNC: 59 MG/DL (ref ?–150)
TSH SERPL DL<=0.05 MIU/L-ACNC: <0.01 UIU/ML (ref 0.45–4.5)